# Patient Record
Sex: FEMALE | Race: ASIAN | NOT HISPANIC OR LATINO | Employment: FULL TIME | URBAN - METROPOLITAN AREA
[De-identification: names, ages, dates, MRNs, and addresses within clinical notes are randomized per-mention and may not be internally consistent; named-entity substitution may affect disease eponyms.]

---

## 2019-01-07 ENCOUNTER — TRANSCRIBE ORDERS (OUTPATIENT)
Dept: LAB | Facility: CLINIC | Age: 54
End: 2019-01-07

## 2019-01-07 ENCOUNTER — APPOINTMENT (OUTPATIENT)
Dept: LAB | Facility: CLINIC | Age: 54
End: 2019-01-07
Payer: COMMERCIAL

## 2019-01-07 DIAGNOSIS — L29.8 OTHER PRURITUS: ICD-10-CM

## 2019-01-07 DIAGNOSIS — L29.9 PRURITUS OF SKIN: ICD-10-CM

## 2019-01-07 DIAGNOSIS — L29.8 OTHER PRURITUS: Primary | ICD-10-CM

## 2019-01-07 PROCEDURE — 85652 RBC SED RATE AUTOMATED: CPT

## 2019-01-07 PROCEDURE — 84480 ASSAY TRIIODOTHYRONINE (T3): CPT

## 2019-01-07 PROCEDURE — 36415 COLL VENOUS BLD VENIPUNCTURE: CPT

## 2019-01-07 PROCEDURE — 82785 ASSAY OF IGE: CPT

## 2019-01-07 PROCEDURE — 86003 ALLG SPEC IGE CRUDE XTRC EA: CPT

## 2019-01-07 PROCEDURE — 84436 ASSAY OF TOTAL THYROXINE: CPT

## 2019-01-07 PROCEDURE — 84443 ASSAY THYROID STIM HORMONE: CPT

## 2019-01-08 LAB
ALLERGEN COMMENT: NORMAL
ERYTHROCYTE [SEDIMENTATION RATE] IN BLOOD: 7 MM/HOUR (ref 0–20)
GLUTEN IGE QN: <0.1 KUA/I
T3 SERPL-MCNC: 1.2 NG/ML (ref 0.6–1.8)
T4 SERPL-MCNC: 10.5 UG/DL (ref 4.7–13.3)
TOTAL IGE SMQN RAST: 9.37 KU/L (ref 0–113)
TSH SERPL DL<=0.05 MIU/L-ACNC: 3.25 UIU/ML (ref 0.36–3.74)

## 2019-02-04 ENCOUNTER — APPOINTMENT (OUTPATIENT)
Dept: LAB | Facility: CLINIC | Age: 54
End: 2019-02-04
Payer: COMMERCIAL

## 2019-02-04 DIAGNOSIS — L29.8 OTHER PRURITUS: ICD-10-CM

## 2019-02-04 DIAGNOSIS — L29.9 PRURITUS OF SKIN: ICD-10-CM

## 2019-02-04 LAB
ALBUMIN SERPL BCP-MCNC: 4.2 G/DL (ref 3.5–5)
ALP SERPL-CCNC: 67 U/L (ref 46–116)
ALT SERPL W P-5'-P-CCNC: 95 U/L (ref 12–78)
AST SERPL W P-5'-P-CCNC: 48 U/L (ref 5–45)
BILIRUB DIRECT SERPL-MCNC: 0.1 MG/DL (ref 0–0.2)
BILIRUB SERPL-MCNC: 0.7 MG/DL (ref 0.2–1)
ERYTHROCYTE [SEDIMENTATION RATE] IN BLOOD: 6 MM/HOUR (ref 0–20)
PROT SERPL-MCNC: 7.8 G/DL (ref 6.4–8.2)

## 2019-02-04 PROCEDURE — 86255 FLUORESCENT ANTIBODY SCREEN: CPT

## 2019-02-04 PROCEDURE — 83516 IMMUNOASSAY NONANTIBODY: CPT

## 2019-02-04 PROCEDURE — 80076 HEPATIC FUNCTION PANEL: CPT

## 2019-02-04 PROCEDURE — 36415 COLL VENOUS BLD VENIPUNCTURE: CPT

## 2019-02-04 PROCEDURE — 82784 ASSAY IGA/IGD/IGG/IGM EACH: CPT

## 2019-02-04 PROCEDURE — 85652 RBC SED RATE AUTOMATED: CPT

## 2019-02-05 LAB
ENDOMYSIUM IGA SER QL: NEGATIVE
TTG IGA SER-ACNC: <2 U/ML (ref 0–3)
TTG IGG SER-ACNC: <2 U/ML (ref 0–5)

## 2019-02-06 LAB
ENDOMYSIUM IGA SER QL: NEGATIVE
GLIADIN PEPTIDE IGA SER-ACNC: 3 UNITS (ref 0–19)
GLIADIN PEPTIDE IGG SER-ACNC: 3 UNITS (ref 0–19)
IGA SERPL-MCNC: 92 MG/DL (ref 87–352)
TTG IGA SER-ACNC: <2 U/ML (ref 0–3)
TTG IGG SER-ACNC: <2 U/ML (ref 0–5)

## 2020-08-14 ENCOUNTER — TRANSCRIBE ORDERS (OUTPATIENT)
Dept: ADMINISTRATIVE | Facility: HOSPITAL | Age: 55
End: 2020-08-14

## 2020-08-14 DIAGNOSIS — N97.0 FEMALE INFERTILITY ASSOCIATED WITH ANOVULATION: Primary | ICD-10-CM

## 2020-08-14 DIAGNOSIS — R19.07 GENERALIZED ABDOMINAL OR PELVIC SWELLING OR MASS OR LUMP: ICD-10-CM

## 2020-08-27 ENCOUNTER — HOSPITAL ENCOUNTER (OUTPATIENT)
Dept: ULTRASOUND IMAGING | Facility: CLINIC | Age: 55
Discharge: HOME | End: 2020-08-27
Payer: COMMERCIAL

## 2020-08-27 DIAGNOSIS — N97.0 FEMALE INFERTILITY ASSOCIATED WITH ANOVULATION: ICD-10-CM

## 2020-08-27 DIAGNOSIS — R19.07 GENERALIZED ABDOMINAL OR PELVIC SWELLING OR MASS OR LUMP: ICD-10-CM

## 2020-08-27 PROCEDURE — 76830 TRANSVAGINAL US NON-OB: CPT

## 2020-08-27 PROCEDURE — 76856 US EXAM PELVIC COMPLETE: CPT

## 2021-04-26 ENCOUNTER — TELEPHONE (OUTPATIENT)
Dept: NEUROLOGY | Facility: CLINIC | Age: 56
End: 2021-04-26

## 2021-05-17 ENCOUNTER — TELEPHONE (OUTPATIENT)
Dept: NEUROLOGY | Facility: CLINIC | Age: 56
End: 2021-05-17

## 2021-05-17 NOTE — TELEPHONE ENCOUNTER
ADD ON - patient is scheduled with Dr Antonieta Mendoza in [facility] 5/18 @ [address] [facility] is still the same

## 2021-05-18 ENCOUNTER — CONSULT (OUTPATIENT)
Dept: NEUROLOGY | Facility: CLINIC | Age: 56
End: 2021-05-18
Payer: COMMERCIAL

## 2021-05-18 VITALS — DIASTOLIC BLOOD PRESSURE: 84 MMHG | SYSTOLIC BLOOD PRESSURE: 129 MMHG | HEART RATE: 73 BPM

## 2021-05-18 DIAGNOSIS — R93.0 ABNORMAL MRI OF HEAD: ICD-10-CM

## 2021-05-18 DIAGNOSIS — G62.9 NEUROPATHY: Primary | ICD-10-CM

## 2021-05-18 PROCEDURE — 99244 OFF/OP CNSLTJ NEW/EST MOD 40: CPT | Performed by: PSYCHIATRY & NEUROLOGY

## 2021-05-18 RX ORDER — MONTELUKAST SODIUM 10 MG/1
10 TABLET ORAL
COMMUNITY
End: 2021-11-15

## 2021-05-18 RX ORDER — EVENING PRIMROSE OIL 500 MG
CAPSULE ORAL
COMMUNITY

## 2021-05-18 RX ORDER — CETIRIZINE HYDROCHLORIDE 10 MG/1
CAPSULE, LIQUID FILLED ORAL
COMMUNITY

## 2021-05-18 RX ORDER — ERGOCALCIFEROL 1.25 MG/1
CAPSULE ORAL
COMMUNITY
Start: 2021-03-16

## 2021-05-18 RX ORDER — ALBUTEROL SULFATE 90 UG/1
2 AEROSOL, METERED RESPIRATORY (INHALATION)
COMMUNITY
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
## 2021-05-18 NOTE — ASSESSMENT & PLAN NOTE
Pt with dec vib lona lowers  No myelopathic findings on exam or by history  Will check mri c spine when pt able  Will update neuropathy labs   To consider emg of lowers   Follow up after studies

## 2021-05-18 NOTE — ASSESSMENT & PLAN NOTE
Pt here for neuro visit  Pt last seen about 5 years ago for wm surviellance  Pt here with increased fatigue and pain in arms and legs  Pt notes she has been seen by pcp as well as rheum  Pt with positive gerardo  Rev in detail all labs done from 2018 to present  Pt with some persistent elevation in lfts  Pt also dx with IC and continues to follow with dr Ravinder Herron  Pt exam relatively stable  Pt with no recent neuro hospitalizations  Pt is overdue for mri head and c spine as part of demylinating surviellance  Re rev last set of imaging ie mri c spine from April 2015  Prior subtle c cord abn no longer seen at that time  Pt wishes to do one study first ie mri head and then will order mri c spine once she is able to check on finances with studies to date  Pt to have updated labs for neuropathy as well

## 2021-05-26 ENCOUNTER — HOSPITAL ENCOUNTER (OUTPATIENT)
Dept: MRI IMAGING | Facility: HOSPITAL | Age: 56
Discharge: HOME | End: 2021-05-26
Attending: PSYCHIATRY & NEUROLOGY
Payer: COMMERCIAL

## 2021-05-26 DIAGNOSIS — R93.0 ABNORMAL MRI OF HEAD: ICD-10-CM

## 2021-05-26 DIAGNOSIS — G62.9 NEUROPATHY: ICD-10-CM

## 2021-05-26 PROCEDURE — G1004 CDSM NDSC: HCPCS

## 2021-05-26 PROCEDURE — 70551 MRI BRAIN STEM W/O DYE: CPT

## 2021-06-03 ENCOUNTER — TELEPHONE (OUTPATIENT)
Dept: NEUROLOGY | Facility: CLINIC | Age: 56
End: 2021-06-03

## 2021-06-03 DIAGNOSIS — G62.9 NEUROPATHY: ICD-10-CM

## 2021-06-03 DIAGNOSIS — R93.0 ABNORMAL MRI OF HEAD: Primary | ICD-10-CM

## 2021-06-03 NOTE — TELEPHONE ENCOUNTER
rx for plain mri c spine without urbano in emr  We will do this one without contrast due to her allergy

## 2021-06-03 NOTE — TELEPHONE ENCOUNTER
Pt made aware  Also reviewed all lab results that have been reviewed by Dr Lawler Bear  No further questions  Transferred call to CS

## 2021-06-03 NOTE — TELEPHONE ENCOUNTER
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

## 2021-06-03 NOTE — TELEPHONE ENCOUNTER
Pt made aware, she verbalizes understanding  She is agreeable to MRI c spine  Per chart review, gadolinium derivatives is listed in pt's allergies (low severity- itching, rash)  Confirmed this with the patient  If gadolinium is needed, pt will need allergy prep (medrol and benadryl)  Discussed with pt there are MRI locations with [facility] in the [place] area   Pt is aware to discuss this with CS when scheduling her MRI     [phone]  Shawna Morgan to leave detailed message

## 2021-06-17 ENCOUNTER — HOSPITAL ENCOUNTER (OUTPATIENT)
Dept: MRI IMAGING | Facility: HOSPITAL | Age: 56
Discharge: HOME | End: 2021-06-17
Attending: PSYCHIATRY & NEUROLOGY
Payer: COMMERCIAL

## 2021-06-17 DIAGNOSIS — R93.0 ABNORMAL MRI OF HEAD: ICD-10-CM

## 2021-06-17 DIAGNOSIS — G62.9 NEUROPATHY: ICD-10-CM

## 2021-06-17 PROCEDURE — G1004 CDSM NDSC: HCPCS

## 2021-06-17 PROCEDURE — 72141 MRI NECK SPINE W/O DYE: CPT

## 2021-06-24 ENCOUNTER — TELEPHONE (OUTPATIENT)
Dept: NEUROLOGY | Facility: CLINIC | Age: 56
End: 2021-06-24

## 2021-06-24 NOTE — TELEPHONE ENCOUNTER
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    mri c spine from June no cord pathology  Pt can be kept under both clinical and rads surveillance  rec return clinical visit in 4 months and then we will decide timing of any further imaging based on her clinical review  ----- Message from Jh Choi RN sent at 6/23/2021  3:25 PM EDT -----  Regarding: FW: Non-Urgent Medical Question  Contact: [phone]    ----- Message -----  From: Kam Enamorado  Sent: 6/23/2021   2:34 PM EDT  To: Neurology Manual [place] Clinical Team 3  Subject: RE: Non-Urgent Medical Question                  Yes the last MRI came back normal  What do I need to do to follow up on the first MRI? Any further testing?     ----- Message -----  From: Aneesh Arellano RN  Sent: 6/23/21, 10:59 AM  To: Kam Enamorado  Subject: RE: Non-Urgent Medical Question    Rolo Pepe  The MRI of the cervical spine was essentially a normal study  It notes the previously seen curvature of the back (cervical lordosis) is slightly straightened out  Please feel free to contact the office if you have further questions         ----- Message -----       From:Nel Groves       Sent:6/23/2021  9:48 AM EDT         Paz Schroeder MD    Subject:RE: Non-Urgent Medical Question    To discuss my test results from my last MRI       ----- Message -----       Bob Lassiter RN       Sent:6/23/2021  9:47 AM EDT         To:Nel Edmonds    Subject:RE: Non-Urgent Medical Question    Thank you for contacting [facility],    Could you please clarify on what you need clarification on? Please do not hesitate to call our office at [phone]      Thank you for choosing [facility] for your care      ----- Message -----       From:Nel Gatica       Sent:6/23/2021  7:09 AM EDT         Richie Browne MD    Subject:Non-Urgent Medical Question    Can I please get explanation of this?

## 2021-06-28 ENCOUNTER — TELEPHONE (OUTPATIENT)
Dept: NEUROLOGY | Facility: CLINIC | Age: 56
End: 2021-06-28

## 2021-06-28 NOTE — TELEPHONE ENCOUNTER
August appt is for clinical surviellance of exam   No further imaging right now, unless change in sxs or exam           Regarding: RE: Non-Urgent Medical Question  Contact: [phone]  ----- Message from Fabio Penaloza RN sent at 6/28/2021 10:47 AM EDT -----  Please advise, thank you!     ----- Message from Farzana Pate to Clemente Dubose MD sent at 6/25/2021  4:53 PM -----   I am scheduled for a follow up appointment in August  Should I reschedule my appointment? Is there another test after this that I can get in the meantime instead of waiting for months for another test        ----- Message -----       Jair Zavala RN       Sent:6/25/2021  4:43 PM EDT         To:Nel Mckeon    Subject:RE: Non-Urgent Medical Question    Thank you for contacting [place] [facility] [address] [facility],    Dr Lon Bender wants to let you know that the MRI head in 2021 was compared to the MRI in 2013  Results state "Again noted are several scattered small white matter   lesions within the subcortical white matter similar in size and appearance to the prior exam   A single right paramedian pontine white matter lesion is stable in size "    Dr oLn Bender says there is again concern for chronic demyelinating disease  The MRI cervical spine from June shows no cord pathology  She is recommending that you return to our office for a visit in 4 months and then we will decide the timing of any further imaging based on your visit  Please do not hesitate to call our office at [phone]  Thank you for choosing [facility] for your care      ----- Message -----       From:Nel Mckeon       Sent:6/23/2021  2:34 PM EDT         Rere Parks MD    Subject:RE: Non-Urgent Medical Question    Yes the last MRI came back normal  What do I need to do to follow up on the first MRI?  Any further testing?       ----- Message -----       From:Shahana Oconnor RN       Sent:6/23/2021 10:59 AM EDT         To:Nel Mckeon    Subject:RE: Non-Urgent Medical Question    Geremias Allen  The MRI of the cervical spine was essentially a normal study  It notes the previously seen curvature of the back (cervical lordosis) is slightly straightened out  Please feel free to contact the office if you have further questions         ----- Message -----       From:Nel Reagan       Sent:6/23/2021  9:48 AM EDT         Yohana Pichardo MD    Subject:RE: Non-Urgent Medical Question    To discuss my test results from my last MRI       ----- Message -----       Shyann Dsouza RN       Sent:6/23/2021  9:47 AM EDT         To:Nel Mejia    Subject:RE: Non-Urgent Medical Question    Thank you for contacting [facility],    Could you please clarify on what you need clarification on? Please do not hesitate to call our office at [phone]  Thank you for choosing [facility] for your care      ----- Message -----       From:Nel Mejia       Sent:6/23/2021  7:09 AM EDT         Yohana Pichardo MD    Subject:Non-Urgent Medical Question    Can I please get explanation of this?

## 2021-08-03 DIAGNOSIS — R11.0 NAUSEA: ICD-10-CM

## 2021-08-03 DIAGNOSIS — R10.84 GENERALIZED ABDOMINAL PAIN: ICD-10-CM

## 2021-08-03 DIAGNOSIS — K59.00 CONSTIPATION, UNSPECIFIED: ICD-10-CM

## 2021-08-11 ENCOUNTER — OFFICE VISIT (OUTPATIENT)
Dept: NEUROLOGY | Facility: CLINIC | Age: 56
End: 2021-08-11
Payer: COMMERCIAL

## 2021-08-11 VITALS
WEIGHT: 123.8 LBS | HEIGHT: 63 IN | HEART RATE: 76 BPM | SYSTOLIC BLOOD PRESSURE: 130 MMHG | BODY MASS INDEX: 21.93 KG/M2 | RESPIRATION RATE: 16 BRPM | TEMPERATURE: 97.8 F | DIASTOLIC BLOOD PRESSURE: 84 MMHG

## 2021-08-11 DIAGNOSIS — R93.0 ABNORMAL MRI OF HEAD: Primary | ICD-10-CM

## 2021-08-11 DIAGNOSIS — G47.10 HYPERSOMNIA: ICD-10-CM

## 2021-08-11 PROCEDURE — 99214 OFFICE O/P EST MOD 30 MIN: CPT | Performed by: PHYSICIAN ASSISTANT

## 2021-08-11 RX ORDER — POLYETHYLENE GLYCOL 3350 17 G/17G
17 POWDER, FOR SOLUTION ORAL DAILY
COMMUNITY

## 2021-08-11 RX ORDER — OMEPRAZOLE 20 MG/1
20 CAPSULE, DELAYED RELEASE ORAL 2 TIMES DAILY
COMMUNITY
Start: 2021-06-30

## 2021-08-11 RX ORDER — FAMOTIDINE 20 MG/1
20 TABLET, FILM COATED ORAL 2 TIMES DAILY
COMMUNITY
Start: 2021-08-03

## 2021-08-11 RX ORDER — POTASSIUM CHLORIDE 1500 MG/1
20 TABLET, EXTENDED RELEASE ORAL DAILY
COMMUNITY
Start: 2021-08-03

## 2021-08-11 RX ORDER — UREA 10 %
1 LOTION (ML) TOPICAL
COMMUNITY
End: 2022-06-06
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
## 2021-08-11 NOTE — PATIENT INSTRUCTIONS
Will update MRI brain end of May 2022 and then return after the imaging   I entered a referral to sleep medicine for consult and likely testing after the consult (will defer which testing to the sleep specialist)  Continue to follow with GI, endocrinology    Could consider another rheumatology opinion  Call for any new or worsening symptoms if needing to be seen sooner

## 2021-08-11 NOTE — ASSESSMENT & PLAN NOTE
Patient reports excessive fatigue, non-restorative sleep, delayed onset of sleep, restlessness  We discussed how poor sleep can negatively impact her cognitively and physically during the day  Would benefit from sleep med eval   She is agreeable, referral placed
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
## 2021-08-26 ENCOUNTER — HOSPITAL ENCOUNTER (OUTPATIENT)
Dept: NUCLEAR MEDICINE | Facility: HOSPITAL | Age: 56
Discharge: HOME | End: 2021-08-26
Payer: COMMERCIAL

## 2021-08-26 DIAGNOSIS — K59.00 CONSTIPATION, UNSPECIFIED: ICD-10-CM

## 2021-08-26 DIAGNOSIS — R10.84 GENERALIZED ABDOMINAL PAIN: ICD-10-CM

## 2021-08-26 DIAGNOSIS — R11.0 NAUSEA: ICD-10-CM

## 2021-08-26 PROCEDURE — 78227 HEPATOBIL SYST IMAGE W/DRUG: CPT

## 2021-08-26 PROCEDURE — A9537 TC99M MEBROFENIN: HCPCS

## 2022-06-09 ENCOUNTER — APPOINTMENT (OUTPATIENT)
Dept: LAB | Facility: CLINIC | Age: 57
End: 2022-06-09
Payer: COMMERCIAL

## 2022-06-09 DIAGNOSIS — M35.00 SICCA SYNDROME (HCC): ICD-10-CM

## 2022-06-09 DIAGNOSIS — E55.9 VITAMIN D DEFICIENCY: ICD-10-CM

## 2022-06-09 PROCEDURE — 86160 COMPLEMENT ANTIGEN: CPT

## 2022-06-09 PROCEDURE — 85027 COMPLETE CBC AUTOMATED: CPT

## 2022-06-09 PROCEDURE — 36415 COLL VENOUS BLD VENIPUNCTURE: CPT

## 2022-06-09 PROCEDURE — 82306 VITAMIN D 25 HYDROXY: CPT

## 2022-06-09 PROCEDURE — 80053 COMPREHEN METABOLIC PANEL: CPT

## 2022-06-10 LAB
25(OH)D3 SERPL-MCNC: 55 NG/ML (ref 30–100)
ALBUMIN SERPL BCP-MCNC: 4.2 G/DL (ref 3.5–5)
ALP SERPL-CCNC: 78 U/L (ref 46–116)
ALT SERPL W P-5'-P-CCNC: 26 U/L (ref 12–78)
ANION GAP SERPL CALCULATED.3IONS-SCNC: 5 MMOL/L (ref 4–13)
AST SERPL W P-5'-P-CCNC: 19 U/L (ref 5–45)
BILIRUB SERPL-MCNC: 0.46 MG/DL (ref 0.2–1)
BUN SERPL-MCNC: 17 MG/DL (ref 5–25)
C3 SERPL-MCNC: 123 MG/DL (ref 90–180)
C4 SERPL-MCNC: 29 MG/DL (ref 10–40)
CALCIUM SERPL-MCNC: 10.4 MG/DL (ref 8.3–10.1)
CHLORIDE SERPL-SCNC: 103 MMOL/L (ref 100–108)
CO2 SERPL-SCNC: 31 MMOL/L (ref 21–32)
CREAT SERPL-MCNC: 0.69 MG/DL (ref 0.6–1.3)
ERYTHROCYTE [DISTWIDTH] IN BLOOD BY AUTOMATED COUNT: 12.3 % (ref 11.6–15.1)
GFR SERPL CREATININE-BSD FRML MDRD: 99 ML/MIN/1.73SQ M
GLUCOSE SERPL-MCNC: 94 MG/DL (ref 65–140)
HCT VFR BLD AUTO: 45.9 % (ref 34.8–46.1)
HGB BLD-MCNC: 15.8 G/DL (ref 11.5–15.4)
MCH RBC QN AUTO: 30.8 PG (ref 26.8–34.3)
MCHC RBC AUTO-ENTMCNC: 34.4 G/DL (ref 31.4–37.4)
MCV RBC AUTO: 90 FL (ref 82–98)
PLATELET # BLD AUTO: 243 THOUSANDS/UL (ref 149–390)
PMV BLD AUTO: 10.8 FL (ref 8.9–12.7)
POTASSIUM SERPL-SCNC: 4.3 MMOL/L (ref 3.5–5.3)
PROT SERPL-MCNC: 7.7 G/DL (ref 6.4–8.2)
RBC # BLD AUTO: 5.13 MILLION/UL (ref 3.81–5.12)
SODIUM SERPL-SCNC: 139 MMOL/L (ref 136–145)
WBC # BLD AUTO: 9.77 THOUSAND/UL (ref 4.31–10.16)

## 2022-07-29 ENCOUNTER — APPOINTMENT (OUTPATIENT)
Dept: LAB | Facility: CLINIC | Age: 57
End: 2022-07-29
Payer: COMMERCIAL

## 2022-07-29 DIAGNOSIS — R19.7 DIARRHEA OF PRESUMED INFECTIOUS ORIGIN: ICD-10-CM

## 2022-07-29 PROCEDURE — 87209 SMEAR COMPLEX STAIN: CPT

## 2022-07-29 PROCEDURE — 87177 OVA AND PARASITES SMEARS: CPT

## 2022-07-29 PROCEDURE — 87505 NFCT AGENT DETECTION GI: CPT

## 2022-07-30 LAB
C DIFF TOX GENS STL QL NAA+PROBE: NEGATIVE
CAMPYLOBACTER DNA SPEC NAA+PROBE: NORMAL
SALMONELLA DNA SPEC QL NAA+PROBE: NORMAL
SHIGA TOXIN STX GENE SPEC NAA+PROBE: NORMAL
SHIGELLA DNA SPEC QL NAA+PROBE: NORMAL

## 2022-08-09 ENCOUNTER — HOSPITAL ENCOUNTER (OUTPATIENT)
Dept: BONE DENSITY | Facility: CLINIC | Age: 57
Discharge: HOME | End: 2022-08-09
Payer: COMMERCIAL

## 2022-08-09 DIAGNOSIS — Z78.0 MENOPAUSE: ICD-10-CM

## 2022-08-09 DIAGNOSIS — R79.89 LOW VITAMIN D LEVEL: ICD-10-CM

## 2022-08-09 PROCEDURE — 77080 DXA BONE DENSITY AXIAL: CPT

## 2022-08-17 ENCOUNTER — APPOINTMENT (OUTPATIENT)
Dept: LAB | Facility: CLINIC | Age: 57
End: 2022-08-17
Payer: COMMERCIAL

## 2022-08-17 DIAGNOSIS — Z86.39 PERSONAL HISTORY OF NUTRITIONAL DEFICIENCY: ICD-10-CM

## 2022-08-17 DIAGNOSIS — D50.9 IRON DEFICIENCY ANEMIA, UNSPECIFIED IRON DEFICIENCY ANEMIA TYPE: ICD-10-CM

## 2022-08-17 LAB
25(OH)D3 SERPL-MCNC: 43 NG/ML (ref 30–100)
ALBUMIN SERPL BCP-MCNC: 3.8 G/DL (ref 3.5–5)
ALP SERPL-CCNC: 82 U/L (ref 46–116)
ALT SERPL W P-5'-P-CCNC: 19 U/L (ref 12–78)
ANION GAP SERPL CALCULATED.3IONS-SCNC: 6 MMOL/L (ref 4–13)
AST SERPL W P-5'-P-CCNC: 15 U/L (ref 5–45)
BILIRUB SERPL-MCNC: 0.37 MG/DL (ref 0.2–1)
BUN SERPL-MCNC: 15 MG/DL (ref 5–25)
CALCIUM SERPL-MCNC: 8.9 MG/DL (ref 8.3–10.1)
CHLORIDE SERPL-SCNC: 105 MMOL/L (ref 96–108)
CO2 SERPL-SCNC: 30 MMOL/L (ref 21–32)
CREAT SERPL-MCNC: 0.76 MG/DL (ref 0.6–1.3)
FERRITIN SERPL-MCNC: 132 NG/ML (ref 8–388)
GFR SERPL CREATININE-BSD FRML MDRD: 89 ML/MIN/1.73SQ M
GLUCOSE P FAST SERPL-MCNC: 106 MG/DL (ref 65–99)
IRON SATN MFR SERPL: 20 % (ref 15–50)
IRON SERPL-MCNC: 68 UG/DL (ref 50–170)
MAGNESIUM SERPL-MCNC: 2.3 MG/DL (ref 1.6–2.6)
POTASSIUM SERPL-SCNC: 3.4 MMOL/L (ref 3.5–5.3)
PROT SERPL-MCNC: 7.1 G/DL (ref 6.4–8.4)
SODIUM SERPL-SCNC: 141 MMOL/L (ref 135–147)
TIBC SERPL-MCNC: 341 UG/DL (ref 250–450)

## 2022-08-17 PROCEDURE — 83540 ASSAY OF IRON: CPT

## 2022-08-17 PROCEDURE — 82306 VITAMIN D 25 HYDROXY: CPT

## 2022-08-17 PROCEDURE — 83735 ASSAY OF MAGNESIUM: CPT

## 2022-08-17 PROCEDURE — 83550 IRON BINDING TEST: CPT

## 2022-08-17 PROCEDURE — 36415 COLL VENOUS BLD VENIPUNCTURE: CPT

## 2022-08-17 PROCEDURE — 80053 COMPREHEN METABOLIC PANEL: CPT

## 2022-08-17 PROCEDURE — 82728 ASSAY OF FERRITIN: CPT

## 2022-09-17 ENCOUNTER — HOSPITAL ENCOUNTER (EMERGENCY)
Facility: HOSPITAL | Age: 57
Discharge: HOME | End: 2022-09-17
Attending: EMERGENCY MEDICINE
Payer: COMMERCIAL

## 2022-09-17 VITALS
DIASTOLIC BLOOD PRESSURE: 96 MMHG | OXYGEN SATURATION: 95 % | TEMPERATURE: 98.3 F | BODY MASS INDEX: 22.18 KG/M2 | RESPIRATION RATE: 21 BRPM | SYSTOLIC BLOOD PRESSURE: 150 MMHG | WEIGHT: 125.22 LBS | HEART RATE: 80 BPM

## 2022-09-17 DIAGNOSIS — G51.0 BELL'S PALSY: Primary | ICD-10-CM

## 2022-09-17 LAB
ALBUMIN SERPL BCP-MCNC: 4.4 G/DL (ref 3.5–5)
ALP SERPL-CCNC: 95 U/L (ref 46–116)
ALT SERPL W P-5'-P-CCNC: 22 U/L (ref 12–78)
ANION GAP SERPL CALCULATED.3IONS-SCNC: 8 MMOL/L (ref 4–13)
AST SERPL W P-5'-P-CCNC: 14 U/L (ref 5–45)
ATRIAL RATE: 79 BPM
BASOPHILS # BLD AUTO: 0.09 THOUSANDS/ΜL (ref 0–0.1)
BASOPHILS NFR BLD AUTO: 1 % (ref 0–1)
BILIRUB SERPL-MCNC: 0.72 MG/DL (ref 0.2–1)
BUN SERPL-MCNC: 10 MG/DL (ref 5–25)
CALCIUM SERPL-MCNC: 9.4 MG/DL (ref 8.3–10.1)
CARDIAC TROPONIN I PNL SERPL HS: 3 NG/L
CHLORIDE SERPL-SCNC: 101 MMOL/L (ref 96–108)
CO2 SERPL-SCNC: 33 MMOL/L (ref 21–32)
CREAT SERPL-MCNC: 0.68 MG/DL (ref 0.6–1.3)
EOSINOPHIL # BLD AUTO: 0.18 THOUSAND/ΜL (ref 0–0.61)
EOSINOPHIL NFR BLD AUTO: 2 % (ref 0–6)
ERYTHROCYTE [DISTWIDTH] IN BLOOD BY AUTOMATED COUNT: 12.2 % (ref 11.6–15.1)
GFR SERPL CREATININE-BSD FRML MDRD: 99 ML/MIN/1.73SQ M
GLUCOSE SERPL-MCNC: 86 MG/DL (ref 65–140)
HCT VFR BLD AUTO: 49.7 % (ref 34.8–46.1)
HGB BLD-MCNC: 16.7 G/DL (ref 11.5–15.4)
IMM GRANULOCYTES # BLD AUTO: 0.03 THOUSAND/UL (ref 0–0.2)
IMM GRANULOCYTES NFR BLD AUTO: 0 % (ref 0–2)
LYMPHOCYTES # BLD AUTO: 2.93 THOUSANDS/ΜL (ref 0.6–4.47)
LYMPHOCYTES NFR BLD AUTO: 28 % (ref 14–44)
MCH RBC QN AUTO: 30 PG (ref 26.8–34.3)
MCHC RBC AUTO-ENTMCNC: 33.6 G/DL (ref 31.4–37.4)
MCV RBC AUTO: 89 FL (ref 82–98)
MONOCYTES # BLD AUTO: 0.99 THOUSAND/ΜL (ref 0.17–1.22)
MONOCYTES NFR BLD AUTO: 10 % (ref 4–12)
NEUTROPHILS # BLD AUTO: 6.15 THOUSANDS/ΜL (ref 1.85–7.62)
NEUTS SEG NFR BLD AUTO: 59 % (ref 43–75)
NRBC BLD AUTO-RTO: 0 /100 WBCS
P AXIS: 48 DEGREES
PLATELET # BLD AUTO: 290 THOUSANDS/UL (ref 149–390)
PMV BLD AUTO: 9.9 FL (ref 8.9–12.7)
POTASSIUM SERPL-SCNC: 3 MMOL/L (ref 3.5–5.3)
PR INTERVAL: 150 MS
PROT SERPL-MCNC: 8.3 G/DL (ref 6.4–8.4)
QRS AXIS: -71 DEGREES
QRSD INTERVAL: 88 MS
QT INTERVAL: 392 MS
QTC INTERVAL: 449 MS
RBC # BLD AUTO: 5.57 MILLION/UL (ref 3.81–5.12)
SODIUM SERPL-SCNC: 142 MMOL/L (ref 135–147)
T WAVE AXIS: 28 DEGREES
VENTRICULAR RATE: 79 BPM
WBC # BLD AUTO: 10.37 THOUSAND/UL (ref 4.31–10.16)

## 2022-09-17 PROCEDURE — 99284 EMERGENCY DEPT VISIT MOD MDM: CPT | Performed by: NURSE PRACTITIONER

## 2022-09-17 PROCEDURE — 84484 ASSAY OF TROPONIN QUANT: CPT

## 2022-09-17 PROCEDURE — 80053 COMPREHEN METABOLIC PANEL: CPT

## 2022-09-17 PROCEDURE — 99284 EMERGENCY DEPT VISIT MOD MDM: CPT

## 2022-09-17 PROCEDURE — 86618 LYME DISEASE ANTIBODY: CPT | Performed by: NURSE PRACTITIONER

## 2022-09-17 PROCEDURE — 93010 ELECTROCARDIOGRAM REPORT: CPT | Performed by: INTERNAL MEDICINE

## 2022-09-17 PROCEDURE — 85025 COMPLETE CBC W/AUTO DIFF WBC: CPT

## 2022-09-17 PROCEDURE — 93005 ELECTROCARDIOGRAM TRACING: CPT

## 2022-09-17 PROCEDURE — 36415 COLL VENOUS BLD VENIPUNCTURE: CPT

## 2022-09-17 RX ORDER — PREDNISONE 20 MG/1
TABLET ORAL
Qty: 24 TABLET | Refills: 0 | Status: SHIPPED | OUTPATIENT
Start: 2022-09-17

## 2022-09-17 RX ORDER — PREDNISONE 20 MG/1
60 TABLET ORAL ONCE
Status: COMPLETED | OUTPATIENT
Start: 2022-09-17 | End: 2022-09-17

## 2022-09-17 RX ORDER — DULOXETIN HYDROCHLORIDE 30 MG/1
30 CAPSULE, DELAYED RELEASE ORAL DAILY
COMMUNITY
Start: 2022-08-24

## 2022-09-17 RX ADMIN — PREDNISONE 60 MG: 20 TABLET ORAL at 11:35

## 2022-09-17 NOTE — Clinical Note
Maame Wanger was seen and treated in our emergency department on 9/17/2022  No restrictions            Diagnosis:     Haily  may return to work on return date  She may return on this date: 09/21/2022         If you have any questions or concerns, please don't hesitate to call        Cathy Rangel RN    ______________________________           _______________          _______________  Hospital Representative                              Date                                Time

## 2022-09-17 NOTE — ED PROVIDER NOTES
History  Chief Complaint   Patient presents with    Facial Droop     Left sided facial droop x 1 5 days and headache x days      This is a 68-year-old female that presents here today with left-sided facial paralysis she reports about 2 days ago she was having a pretty wicked left-sided headache and associated periauricular pain  Today she noticed when she was brushing her teeth the did not feel the same as the other side so she presented here  She clearly has facial paralysis involving the left upper and lower face  Her left eye is not completely blinking but can be closed with intention          Prior to Admission Medications   Prescriptions Last Dose Informant Patient Reported? Taking?    Breo Ellipta 200-25 MCG/INH inhaler   No No   Sig: INHALE 1 PUFF BY MOUTH EVERY DAY   Cetirizine HCl (ZyrTEC Allergy) 10 MG CAPS   Yes No   Sig: Take by mouth   DULoxetine (CYMBALTA) 30 mg delayed release capsule   Yes No   Sig: Take 30 mg by mouth daily   Evening Primrose Oil 500 MG CAPS   Yes No   Sig: Take by mouth   Klor-Con M20 20 MEQ tablet   Yes No   Sig: Take 20 mEq by mouth daily   albuterol (PROVENTIL HFA,VENTOLIN HFA) 90 mcg/act inhaler   Yes No   Sig: Inhale 2 puffs   ergocalciferol (VITAMIN D2) 50,000 units   Yes No   Sig: TAKE 1 CAPSULE BY MOUTH ONE TIME PER WEEK   famotidine (PEPCID) 20 mg tablet   Yes No   Sig: Take 20 mg by mouth 2 (two) times a day   hydroxychloroquine (PLAQUENIL) 200 mg tablet   Yes No   Sig: Take 200 mg by mouth daily   montelukast (SINGULAIR) 10 mg tablet   No No   Sig: TAKE 1 TABLET BY MOUTH EVERY DAY   omeprazole (PriLOSEC) 20 mg delayed release capsule   Yes No   Sig: Take 20 mg by mouth 2 (two) times a day    polyethylene glycol (GLYCOLAX) 17 GM/SCOOP powder   Yes No   Sig: Take 17 g by mouth daily   vitamin E 100 UNIT capsule   Yes No   Sig: Take 100 Units by mouth      Facility-Administered Medications: None       Past Medical History:   Diagnosis Date    GERD (gastroesophageal reflux disease)     Irritable bowel syndrome with constipation     [facility]       No past surgical history on file  Family History   Adopted: Yes   Family history unknown: Yes     I have reviewed and agree with the history as documented  E-Cigarette/Vaping    E-Cigarette Use Never User      E-Cigarette/Vaping Substances    Nicotine No     THC No     CBD No     Flavoring No     Other No     Unknown No      Social History     Tobacco Use    Smoking status: Never Smoker    Smokeless tobacco: Never Used   Vaping Use    Vaping Use: Never used   Substance Use Topics    Alcohol use: Never    Drug use: Never       Review of Systems   Constitutional: Negative for diaphoresis, fatigue and fever  HENT: Negative for congestion, ear pain, nosebleeds and sore throat  Eyes: Negative for photophobia, pain, discharge and visual disturbance  Respiratory: Negative for cough, choking, chest tightness, shortness of breath and wheezing  Cardiovascular: Negative for chest pain and palpitations  Gastrointestinal: Negative for abdominal distention, abdominal pain, diarrhea and vomiting  Genitourinary: Negative for dysuria, flank pain and frequency  Musculoskeletal: Negative for back pain, gait problem and joint swelling  Skin: Negative for color change and rash  Neurological: Positive for facial asymmetry  Negative for dizziness, syncope, speech difficulty, weakness and headaches  Psychiatric/Behavioral: Negative for behavioral problems and confusion  The patient is not nervous/anxious  All other systems reviewed and are negative  Physical Exam  Physical Exam  Vitals and nursing note reviewed  Constitutional:       General: She is not in acute distress  Appearance: She is well-developed  She is not ill-appearing or toxic-appearing  HENT:      Head: Normocephalic and atraumatic  Mouth/Throat:      Dentition: Normal dentition     Eyes:      General: Right eye: No discharge  Left eye: No discharge  Cardiovascular:      Rate and Rhythm: Normal rate and regular rhythm  Pulmonary:      Effort: Pulmonary effort is normal  No accessory muscle usage or respiratory distress  Abdominal:      General: There is no distension  Tenderness: There is no guarding  Musculoskeletal:         General: Normal range of motion  Cervical back: Normal range of motion and neck supple  Skin:     General: Skin is warm and dry  Neurological:      Mental Status: She is alert and oriented to person, place, and time  Coordination: Coordination normal       Comments: House Brackmann grade 3 moderate dysfunction   Psychiatric:         Behavior: Behavior is cooperative           Vital Signs  ED Triage Vitals   Temperature Pulse Respirations Blood Pressure SpO2   09/17/22 1137 09/17/22 1103 09/17/22 1103 09/17/22 1103 09/17/22 1103   98 3 °F (36 8 °C) 94 18 (!) 196/102 97 %      Temp Source Heart Rate Source Patient Position - Orthostatic VS BP Location FiO2 (%)   09/17/22 1137 -- -- -- --   Oral          Pain Score       --                  Vitals:    09/17/22 1103   BP: (!) 196/102   Pulse: 94         Visual Acuity      ED Medications  Medications   predniSONE tablet 60 mg (60 mg Oral Given 9/17/22 1135)       Diagnostic Studies  Results Reviewed     Procedure Component Value Units Date/Time    HS Troponin I 2hr [[id number]]     Lab Status: No result Specimen: Blood     HS Troponin 0hr (reflex protocol) [[id number]]  (Normal) Collected: 09/17/22 1106    Lab Status: Final result Specimen: Blood from Arm, Left Updated: 09/17/22 1139     hs TnI 0hr 3 ng/L     Comprehensive metabolic panel [[id number]]  (Abnormal) Collected: 09/17/22 1106    Lab Status: Final result Specimen: Blood from Arm, Left Updated: 09/17/22 1135     Sodium 142 mmol/L      Potassium 3 0 mmol/L      Chloride 101 mmol/L      CO2 33 mmol/L      ANION GAP 8 mmol/L      BUN 10 mg/dL      Creatinine 0 68 mg/dL      Glucose 86 mg/dL      Calcium 9 4 mg/dL      AST 14 U/L      ALT 22 U/L      Alkaline Phosphatase 95 U/L      Total Protein 8 3 g/dL      Albumin 4 4 g/dL      Total Bilirubin 0 72 mg/dL      eGFR 99 ml/min/1 73sq m     Narrative:      [place] guidelines for Chronic Kidney Disease (CKD):     Stage 1 with normal or high GFR (GFR > 90 mL/min/1 73 square meters)    Stage 2 Mild CKD (GFR = 60-89 mL/min/1 73 square meters)    Stage 3A Moderate CKD (GFR = 45-59 mL/min/1 73 square meters)    Stage 3B Moderate CKD (GFR = 30-44 mL/min/1 73 square meters)    Stage 4 Severe CKD (GFR = 15-29 mL/min/1 73 square meters)    Stage 5 End Stage CKD (GFR <15 mL/min/1 73 square meters)  Note: GFR calculation is accurate only with a steady state creatinine    Lyme Antibody Profile with reflex to [facility] [[id number]] Collected: 09/17/22 1128    Lab Status:  In process Specimen: Blood from Arm, Left Updated: 09/17/22 1132    CBC and differential [[id number]]  (Abnormal) Collected: 09/17/22 1106    Lab Status: Final result Specimen: Blood from Arm, Left Updated: 09/17/22 1113     WBC 10 37 Thousand/uL      RBC 5 57 Million/uL      Hemoglobin 16 7 g/dL      Hematocrit 49 7 %      MCV 89 fL      MCH 30 0 pg      MCHC 33 6 g/dL      RDW 12 2 %      MPV 9 9 fL      Platelets 297 Thousands/uL      nRBC 0 /100 WBCs      Neutrophils Relative 59 %      Immat GRANS % 0 %      Lymphocytes Relative 28 %      Monocytes Relative 10 %      Eosinophils Relative 2 %      Basophils Relative 1 %      Neutrophils Absolute 6 15 Thousands/µL      Immature Grans Absolute 0 03 Thousand/uL      Lymphocytes Absolute 2 93 Thousands/µL      Monocytes Absolute 0 99 Thousand/µL      Eosinophils Absolute 0 18 Thousand/µL      Basophils Absolute 0 09 Thousands/µL                  No orders to display              Procedures  Procedures         ED Course                                             MDM  Number of Diagnoses or Management Options  Bell's palsy: new and requires workup  Diagnosis management comments: Symptoms consistent with a Bell's palsy  Nothing that would suggest stroke  No hemiparesis or weakness  No aphasia  Amount and/or Complexity of Data Reviewed  Clinical lab tests: ordered and reviewed  Independent visualization of images, tracings, or specimens: yes    Patient Progress  Patient progress: stable      Disposition  Final diagnoses:   Bell's palsy     Time reflects when diagnosis was documented in both MDM as applicable and the Disposition within this note     Time User Action Codes Description Comment    9/17/2022 11:58 AM Ludin Narvaez Add [G51 0] Bell's palsy       ED Disposition     ED Disposition   Discharge    Condition   Stable    Date/Time   Sat Sep 17, 2022 11:58 AM    Comment   Sonam Oquendo discharge to home/self care  Follow-up Information     Follow up With Specialties Details Why Contact Info Additional Information    Priti Ty PA-C Physician Assistant Schedule an appointment as soon as possible for a visit  For [address]  [address] [place] [id number]  [facility] Neurology [address] [facility] Neurology Schedule an appointment as soon as possible for a visit  For Continued Evaluation [address] [facility] [id number]  [address] [facility] Neurology [address], [address] [phone]          Patient's Medications   Discharge Prescriptions    PREDNISONE 20 MG TABLET    Take 60 mg by mouth daily 4 days, 40 mg by mouth daily 4 days, 20 mg by mouth daily 4 days       Start Date: 9/17/2022 End Date: --       Order Dose: --       Quantity: 24 tablet    Refills: 0       No discharge procedures on file      PDMP Review     None          ED Provider  Electronically Signed by           YOLY Junior  09/17/22 1200

## 2022-09-19 LAB — B BURGDOR IGG+IGM SER-ACNC: 0.2 AI

## 2022-10-06 ENCOUNTER — HOSPITAL ENCOUNTER (OUTPATIENT)
Dept: CT IMAGING | Facility: HOSPITAL | Age: 57
End: 2022-10-06
Payer: COMMERCIAL

## 2022-10-06 DIAGNOSIS — G51.0 BELL'S PALSY: ICD-10-CM

## 2022-10-06 PROCEDURE — 70470 CT HEAD/BRAIN W/O & W/DYE: CPT

## 2022-10-06 RX ADMIN — IOHEXOL 100 ML: 350 INJECTION, SOLUTION INTRAVENOUS at 11:56

## 2022-10-17 ENCOUNTER — APPOINTMENT (OUTPATIENT)
Dept: LAB | Facility: HOSPITAL | Age: 57
End: 2022-10-17
Payer: COMMERCIAL

## 2022-10-17 DIAGNOSIS — Z86.39 HISTORY OF LOW POTASSIUM: ICD-10-CM

## 2022-10-17 LAB
ALBUMIN SERPL BCP-MCNC: 4 G/DL (ref 3.5–5)
ALP SERPL-CCNC: 93 U/L (ref 46–116)
ALT SERPL W P-5'-P-CCNC: 20 U/L (ref 12–78)
ANION GAP SERPL CALCULATED.3IONS-SCNC: 9 MMOL/L (ref 4–13)
AST SERPL W P-5'-P-CCNC: 13 U/L (ref 5–45)
BILIRUB SERPL-MCNC: 0.58 MG/DL (ref 0.2–1)
BUN SERPL-MCNC: 16 MG/DL (ref 5–25)
CALCIUM SERPL-MCNC: 9.5 MG/DL (ref 8.3–10.1)
CHLORIDE SERPL-SCNC: 103 MMOL/L (ref 96–108)
CO2 SERPL-SCNC: 33 MMOL/L (ref 21–32)
CREAT SERPL-MCNC: 0.8 MG/DL (ref 0.6–1.3)
GFR SERPL CREATININE-BSD FRML MDRD: 83 ML/MIN/1.73SQ M
GLUCOSE P FAST SERPL-MCNC: 98 MG/DL (ref 65–99)
POTASSIUM SERPL-SCNC: 2.9 MMOL/L (ref 3.5–5.3)
PROT SERPL-MCNC: 7.4 G/DL (ref 6.4–8.4)
SODIUM SERPL-SCNC: 145 MMOL/L (ref 135–147)

## 2022-10-17 PROCEDURE — 36415 COLL VENOUS BLD VENIPUNCTURE: CPT

## 2022-10-17 PROCEDURE — 80053 COMPREHEN METABOLIC PANEL: CPT

## 2022-11-21 ENCOUNTER — APPOINTMENT (OUTPATIENT)
Dept: LAB | Facility: HOSPITAL | Age: 57
End: 2022-11-21

## 2022-11-21 DIAGNOSIS — G51.0 BELL'S PALSY: ICD-10-CM

## 2022-11-21 DIAGNOSIS — M35.00 SJOGREN'S SYNDROME WITHOUT EXTRAGLANDULAR INVOLVEMENT (HCC): ICD-10-CM

## 2022-11-21 DIAGNOSIS — Z86.39 HISTORY OF LOW POTASSIUM: ICD-10-CM

## 2022-11-22 LAB
ALBUMIN SERPL BCP-MCNC: 4.1 G/DL (ref 3.5–5)
ALP SERPL-CCNC: 75 U/L (ref 46–116)
ALT SERPL W P-5'-P-CCNC: 23 U/L (ref 12–78)
ANION GAP SERPL CALCULATED.3IONS-SCNC: 5 MMOL/L (ref 4–13)
AST SERPL W P-5'-P-CCNC: 17 U/L (ref 5–45)
BILIRUB SERPL-MCNC: 0.72 MG/DL (ref 0.2–1)
BUN SERPL-MCNC: 12 MG/DL (ref 5–25)
CALCIUM SERPL-MCNC: 9.8 MG/DL (ref 8.3–10.1)
CHLORIDE SERPL-SCNC: 105 MMOL/L (ref 96–108)
CO2 SERPL-SCNC: 30 MMOL/L (ref 21–32)
CREAT SERPL-MCNC: 0.74 MG/DL (ref 0.6–1.3)
GFR SERPL CREATININE-BSD FRML MDRD: 92 ML/MIN/1.73SQ M
GLUCOSE P FAST SERPL-MCNC: 102 MG/DL (ref 65–99)
MAGNESIUM SERPL-MCNC: 2.4 MG/DL (ref 1.6–2.6)
POTASSIUM SERPL-SCNC: 3.8 MMOL/L (ref 3.5–5.3)
PROT SERPL-MCNC: 7.2 G/DL (ref 6.4–8.4)
SODIUM SERPL-SCNC: 140 MMOL/L (ref 135–147)

## 2022-12-12 PROBLEM — R76.8 POSITIVE ANTINUCLEAR ANTIBODY: Status: ACTIVE | Noted: 2021-10-07

## 2022-12-12 PROBLEM — M35.00 SJOGREN'S SYNDROME WITHOUT EXTRAGLANDULAR INVOLVEMENT (HCC): Status: ACTIVE | Noted: 2022-09-23

## 2022-12-12 PROBLEM — G47.00 INSOMNIA: Status: ACTIVE | Noted: 2021-11-18

## 2022-12-12 PROBLEM — J30.89 ENVIRONMENTAL AND SEASONAL ALLERGIES: Status: ACTIVE | Noted: 2021-03-16

## 2022-12-12 PROBLEM — J30.9 ALLERGIC RHINITIS: Status: ACTIVE | Noted: 2022-12-12

## 2022-12-12 PROBLEM — K90.41 NCGS (NON-CELIAC GLUTEN SENSITIVITY): Status: ACTIVE | Noted: 2022-05-10

## 2022-12-12 PROBLEM — J45.40 MODERATE PERSISTENT ASTHMA WITHOUT COMPLICATION: Status: ACTIVE | Noted: 2022-12-12

## 2022-12-12 PROBLEM — K21.9 GASTROESOPHAGEAL REFLUX DISEASE WITHOUT ESOPHAGITIS: Status: ACTIVE | Noted: 2022-12-12

## 2022-12-12 PROBLEM — N30.10 CYSTITIS, INTERSTITIAL: Status: ACTIVE | Noted: 2021-03-16

## 2022-12-12 PROBLEM — M25.60 JOINT STIFFNESS: Status: ACTIVE | Noted: 2021-06-14

## 2022-12-12 PROBLEM — Z86.39 HISTORY OF LOW POTASSIUM: Status: ACTIVE | Noted: 2021-11-18

## 2022-12-12 PROBLEM — R05.9 COUGH: Status: ACTIVE | Noted: 2018-03-08

## 2022-12-21 DIAGNOSIS — J45.40 MODERATE PERSISTENT ASTHMA WITHOUT COMPLICATION: Primary | ICD-10-CM

## 2022-12-21 RX ORDER — FLUTICASONE FUROATE, UMECLIDINIUM BROMIDE AND VILANTEROL TRIFENATATE 100; 62.5; 25 UG/1; UG/1; UG/1
1 POWDER RESPIRATORY (INHALATION) DAILY
Qty: 60 BLISTER | Refills: 0 | Status: SHIPPED | OUTPATIENT
Start: 2022-12-21 | End: 2023-01-20

## 2022-12-21 NOTE — PROGRESS NOTES
She was recently treated for rhinosinusitis with Zithromax  Symptoms of cough has improved but not completely gone away  As to the efficacy of trelegy, not certain as to whether any different than Breo but this new prescription was only for 1 week and hence difficult to assess and therefore we will give additional 1 month supply    Explained to the patient that if the cough does not improve then to call and in that situation we may consider further evaluation from the standpoint of sinusitis

## 2023-01-02 ENCOUNTER — APPOINTMENT (OUTPATIENT)
Dept: LAB | Facility: HOSPITAL | Age: 58
End: 2023-01-02

## 2023-01-02 DIAGNOSIS — Z86.39 HISTORY OF LOW POTASSIUM: ICD-10-CM

## 2023-01-02 LAB
ALBUMIN SERPL BCP-MCNC: 4.2 G/DL (ref 3.5–5)
ALP SERPL-CCNC: 88 U/L (ref 46–116)
ALT SERPL W P-5'-P-CCNC: 25 U/L (ref 12–78)
ANION GAP SERPL CALCULATED.3IONS-SCNC: 9 MMOL/L (ref 4–13)
AST SERPL W P-5'-P-CCNC: 26 U/L (ref 5–45)
BILIRUB SERPL-MCNC: 0.6 MG/DL (ref 0.2–1)
BUN SERPL-MCNC: 9 MG/DL (ref 5–25)
CALCIUM SERPL-MCNC: 9.1 MG/DL (ref 8.3–10.1)
CHLORIDE SERPL-SCNC: 103 MMOL/L (ref 96–108)
CO2 SERPL-SCNC: 30 MMOL/L (ref 21–32)
CREAT SERPL-MCNC: 0.71 MG/DL (ref 0.6–1.3)
GFR SERPL CREATININE-BSD FRML MDRD: 96 ML/MIN/1.73SQ M
GLUCOSE SERPL-MCNC: 101 MG/DL (ref 65–140)
POTASSIUM SERPL-SCNC: 3.2 MMOL/L (ref 3.5–5.3)
PROT SERPL-MCNC: 7.4 G/DL (ref 6.4–8.4)
SODIUM SERPL-SCNC: 142 MMOL/L (ref 135–147)

## 2023-02-10 PROBLEM — R05.9 COUGH: Status: RESOLVED | Noted: 2018-03-08 | Resolved: 2023-02-10

## 2023-03-02 ENCOUNTER — APPOINTMENT (OUTPATIENT)
Dept: LAB | Facility: CLINIC | Age: 58
End: 2023-03-02

## 2023-03-02 DIAGNOSIS — E55.9 AVITAMINOSIS D: ICD-10-CM

## 2023-03-02 DIAGNOSIS — M35.00 SICCA SYNDROME (HCC): ICD-10-CM

## 2023-03-02 DIAGNOSIS — Z79.899 ENCOUNTER FOR LONG-TERM (CURRENT) USE OF OTHER MEDICATIONS: ICD-10-CM

## 2023-03-02 LAB
ALBUMIN SERPL BCP-MCNC: 4.2 G/DL (ref 3.5–5)
ALP SERPL-CCNC: 83 U/L (ref 46–116)
ALT SERPL W P-5'-P-CCNC: 32 U/L (ref 12–78)
ANION GAP SERPL CALCULATED.3IONS-SCNC: 3 MMOL/L (ref 4–13)
AST SERPL W P-5'-P-CCNC: 24 U/L (ref 5–45)
BASOPHILS # BLD AUTO: 0.08 THOUSANDS/ÂΜL (ref 0–0.1)
BASOPHILS NFR BLD AUTO: 1 % (ref 0–1)
BILIRUB SERPL-MCNC: 0.36 MG/DL (ref 0.2–1)
BUN SERPL-MCNC: 15 MG/DL (ref 5–25)
CALCIUM SERPL-MCNC: 9.5 MG/DL (ref 8.3–10.1)
CHLORIDE SERPL-SCNC: 105 MMOL/L (ref 96–108)
CO2 SERPL-SCNC: 30 MMOL/L (ref 21–32)
CREAT SERPL-MCNC: 0.76 MG/DL (ref 0.6–1.3)
EOSINOPHIL # BLD AUTO: 0.37 THOUSAND/ÂΜL (ref 0–0.61)
EOSINOPHIL NFR BLD AUTO: 4 % (ref 0–6)
ERYTHROCYTE [DISTWIDTH] IN BLOOD BY AUTOMATED COUNT: 12.3 % (ref 11.6–15.1)
GFR SERPL CREATININE-BSD FRML MDRD: 88 ML/MIN/1.73SQ M
GLUCOSE P FAST SERPL-MCNC: 95 MG/DL (ref 65–99)
HCT VFR BLD AUTO: 45.9 % (ref 34.8–46.1)
HGB BLD-MCNC: 15.4 G/DL (ref 11.5–15.4)
IMM GRANULOCYTES # BLD AUTO: 0.03 THOUSAND/UL (ref 0–0.2)
IMM GRANULOCYTES NFR BLD AUTO: 0 % (ref 0–2)
LYMPHOCYTES # BLD AUTO: 2.89 THOUSANDS/ÂΜL (ref 0.6–4.47)
LYMPHOCYTES NFR BLD AUTO: 33 % (ref 14–44)
MCH RBC QN AUTO: 29.7 PG (ref 26.8–34.3)
MCHC RBC AUTO-ENTMCNC: 33.6 G/DL (ref 31.4–37.4)
MCV RBC AUTO: 88 FL (ref 82–98)
MONOCYTES # BLD AUTO: 0.89 THOUSAND/ÂΜL (ref 0.17–1.22)
MONOCYTES NFR BLD AUTO: 10 % (ref 4–12)
NEUTROPHILS # BLD AUTO: 4.4 THOUSANDS/ÂΜL (ref 1.85–7.62)
NEUTS SEG NFR BLD AUTO: 52 % (ref 43–75)
NRBC BLD AUTO-RTO: 0 /100 WBCS
PLATELET # BLD AUTO: 268 THOUSANDS/UL (ref 149–390)
PMV BLD AUTO: 10.7 FL (ref 8.9–12.7)
POTASSIUM SERPL-SCNC: 3.6 MMOL/L (ref 3.5–5.3)
PROT SERPL-MCNC: 7 G/DL (ref 6.4–8.4)
RBC # BLD AUTO: 5.19 MILLION/UL (ref 3.81–5.12)
SODIUM SERPL-SCNC: 138 MMOL/L (ref 135–147)
WBC # BLD AUTO: 8.66 THOUSAND/UL (ref 4.31–10.16)

## 2023-03-03 LAB
25(OH)D3 SERPL-MCNC: 33.2 NG/ML (ref 30–100)
C3 SERPL-MCNC: 112 MG/DL (ref 90–180)
C4 SERPL-MCNC: 27 MG/DL (ref 10–40)

## 2023-05-08 ENCOUNTER — APPOINTMENT (OUTPATIENT)
Dept: LAB | Facility: HOSPITAL | Age: 58
End: 2023-05-08

## 2023-05-08 DIAGNOSIS — M35.00 SICCA SYNDROME (HCC): ICD-10-CM

## 2023-05-08 LAB
ALBUMIN SERPL BCP-MCNC: 4.7 G/DL (ref 3.5–5)
ALP SERPL-CCNC: 80 U/L (ref 34–104)
ALT SERPL W P-5'-P-CCNC: 25 U/L (ref 7–52)
ANION GAP SERPL CALCULATED.3IONS-SCNC: 6 MMOL/L (ref 4–13)
AST SERPL W P-5'-P-CCNC: 22 U/L (ref 13–39)
BILIRUB SERPL-MCNC: 0.47 MG/DL (ref 0.2–1)
BUN SERPL-MCNC: 16 MG/DL (ref 5–25)
CALCIUM SERPL-MCNC: 10.1 MG/DL (ref 8.4–10.2)
CHLORIDE SERPL-SCNC: 102 MMOL/L (ref 96–108)
CO2 SERPL-SCNC: 32 MMOL/L (ref 21–32)
CREAT SERPL-MCNC: 0.71 MG/DL (ref 0.6–1.3)
ERYTHROCYTE [DISTWIDTH] IN BLOOD BY AUTOMATED COUNT: 12.5 % (ref 11.6–15.1)
GFR SERPL CREATININE-BSD FRML MDRD: 96 ML/MIN/1.73SQ M
GLUCOSE SERPL-MCNC: 98 MG/DL (ref 65–140)
HCT VFR BLD AUTO: 44.7 % (ref 34.8–46.1)
HGB BLD-MCNC: 15.2 G/DL (ref 11.5–15.4)
MCH RBC QN AUTO: 30 PG (ref 26.8–34.3)
MCHC RBC AUTO-ENTMCNC: 34 G/DL (ref 31.4–37.4)
MCV RBC AUTO: 88 FL (ref 82–98)
PLATELET # BLD AUTO: 258 THOUSANDS/UL (ref 149–390)
PMV BLD AUTO: 10.1 FL (ref 8.9–12.7)
POTASSIUM SERPL-SCNC: 3.9 MMOL/L (ref 3.5–5.3)
PROT SERPL-MCNC: 7.1 G/DL (ref 6.4–8.4)
RBC # BLD AUTO: 5.07 MILLION/UL (ref 3.81–5.12)
SODIUM SERPL-SCNC: 140 MMOL/L (ref 135–147)
WBC # BLD AUTO: 8.24 THOUSAND/UL (ref 4.31–10.16)

## 2023-05-09 LAB
C3 SERPL-MCNC: 131 MG/DL (ref 90–180)
C4 SERPL-MCNC: 31 MG/DL (ref 10–40)

## 2023-08-14 ENCOUNTER — APPOINTMENT (OUTPATIENT)
Dept: LAB | Facility: HOSPITAL | Age: 58
End: 2023-08-14
Payer: COMMERCIAL

## 2023-08-14 DIAGNOSIS — M35.00 SICCA SYNDROME (HCC): ICD-10-CM

## 2023-08-14 DIAGNOSIS — E55.9 AVITAMINOSIS D: ICD-10-CM

## 2023-08-14 LAB
25(OH)D3 SERPL-MCNC: 41.1 NG/ML (ref 30–100)
ALBUMIN SERPL BCP-MCNC: 4.6 G/DL (ref 3.5–5)
ALP SERPL-CCNC: 81 U/L (ref 34–104)
ALT SERPL W P-5'-P-CCNC: 14 U/L (ref 7–52)
ANION GAP SERPL CALCULATED.3IONS-SCNC: 8 MMOL/L
AST SERPL W P-5'-P-CCNC: 17 U/L (ref 13–39)
BASOPHILS # BLD AUTO: 0.1 THOUSANDS/ÂΜL (ref 0–0.1)
BASOPHILS NFR BLD AUTO: 2 % (ref 0–1)
BILIRUB SERPL-MCNC: 0.87 MG/DL (ref 0.2–1)
BILIRUB UR QL STRIP: NEGATIVE
BUN SERPL-MCNC: 15 MG/DL (ref 5–25)
C3 SERPL-MCNC: 119 MG/DL (ref 90–180)
C4 SERPL-MCNC: 27 MG/DL (ref 10–40)
CALCIUM SERPL-MCNC: 10 MG/DL (ref 8.4–10.2)
CHLORIDE SERPL-SCNC: 101 MMOL/L (ref 96–108)
CLARITY UR: NORMAL
CO2 SERPL-SCNC: 33 MMOL/L (ref 21–32)
COLOR UR: NORMAL
CREAT SERPL-MCNC: 0.87 MG/DL (ref 0.6–1.3)
EOSINOPHIL # BLD AUTO: 0.28 THOUSAND/ÂΜL (ref 0–0.61)
EOSINOPHIL NFR BLD AUTO: 4 % (ref 0–6)
ERYTHROCYTE [DISTWIDTH] IN BLOOD BY AUTOMATED COUNT: 12.1 % (ref 11.6–15.1)
GFR SERPL CREATININE-BSD FRML MDRD: 75 ML/MIN/1.73SQ M
GLUCOSE P FAST SERPL-MCNC: 81 MG/DL (ref 65–99)
GLUCOSE UR STRIP-MCNC: NEGATIVE MG/DL
HCT VFR BLD AUTO: 47 % (ref 34.8–46.1)
HGB BLD-MCNC: 16 G/DL (ref 11.5–15.4)
HGB UR QL STRIP.AUTO: NEGATIVE
IMM GRANULOCYTES # BLD AUTO: 0.02 THOUSAND/UL (ref 0–0.2)
IMM GRANULOCYTES NFR BLD AUTO: 0 % (ref 0–2)
KETONES UR STRIP-MCNC: NEGATIVE MG/DL
LEUKOCYTE ESTERASE UR QL STRIP: NEGATIVE
LYMPHOCYTES # BLD AUTO: 2.32 THOUSANDS/ÂΜL (ref 0.6–4.47)
LYMPHOCYTES NFR BLD AUTO: 34 % (ref 14–44)
MCH RBC QN AUTO: 29.7 PG (ref 26.8–34.3)
MCHC RBC AUTO-ENTMCNC: 34 G/DL (ref 31.4–37.4)
MCV RBC AUTO: 87 FL (ref 82–98)
MONOCYTES # BLD AUTO: 0.66 THOUSAND/ÂΜL (ref 0.17–1.22)
MONOCYTES NFR BLD AUTO: 10 % (ref 4–12)
NEUTROPHILS # BLD AUTO: 3.39 THOUSANDS/ÂΜL (ref 1.85–7.62)
NEUTS SEG NFR BLD AUTO: 50 % (ref 43–75)
NITRITE UR QL STRIP: NEGATIVE
NRBC BLD AUTO-RTO: 0 /100 WBCS
PH UR STRIP.AUTO: 7 [PH]
PLATELET # BLD AUTO: 264 THOUSANDS/UL (ref 149–390)
PMV BLD AUTO: 10.4 FL (ref 8.9–12.7)
POTASSIUM SERPL-SCNC: 3.7 MMOL/L (ref 3.5–5.3)
PROT SERPL-MCNC: 7.1 G/DL (ref 6.4–8.4)
PROT UR STRIP-MCNC: NEGATIVE MG/DL
RBC # BLD AUTO: 5.38 MILLION/UL (ref 3.81–5.12)
SODIUM SERPL-SCNC: 142 MMOL/L (ref 135–147)
SP GR UR STRIP.AUTO: 1.01 (ref 1–1.03)
UROBILINOGEN UR STRIP-ACNC: <2 MG/DL
WBC # BLD AUTO: 6.77 THOUSAND/UL (ref 4.31–10.16)

## 2023-08-14 PROCEDURE — 81003 URINALYSIS AUTO W/O SCOPE: CPT

## 2023-08-14 PROCEDURE — 85025 COMPLETE CBC W/AUTO DIFF WBC: CPT

## 2023-08-14 PROCEDURE — 82306 VITAMIN D 25 HYDROXY: CPT

## 2023-08-14 PROCEDURE — 80053 COMPREHEN METABOLIC PANEL: CPT

## 2023-08-14 PROCEDURE — 86160 COMPLEMENT ANTIGEN: CPT

## 2023-08-14 PROCEDURE — 36415 COLL VENOUS BLD VENIPUNCTURE: CPT

## 2023-09-13 ENCOUNTER — TELEPHONE (OUTPATIENT)
Dept: NEUROLOGY | Facility: CLINIC | Age: 58
End: 2023-09-13

## 2023-09-13 NOTE — TELEPHONE ENCOUNTER
PCP called in with Pt. pt never had her MRI done and they are asking if it can be reordered so she can have it completed and then come in for a f/u appt. Please assist. And call pt with next step.    Thank you

## 2023-09-13 NOTE — TELEPHONE ENCOUNTER
This patient has not been seen in over 2 years. MRI cannot be ordered without a recent visit to document medical necessity of the imaging.

## 2023-09-14 NOTE — TELEPHONE ENCOUNTER
received vm from 9:19am-A hi, this is Oswaldo Hernandez,  birthday [birth year], phone number [phone]. I am just following up on a call, my doctor's office, my primary doctors office called yesterday regarding an authorization for an MRI. If someone could call me back, I'd like to get that authorization and schedule both the MRI and my appointment with Dr. Amie Mackenzie. Thank you  --------------------------------------------------  Called pt.   Made her aware of below  Scheduled pt to see cornell on 9/29  Added to the wait list     States that she has new insurance   [facility]  [employer] [id number]  [id number]    This info is already noted in chart

## 2023-09-20 ENCOUNTER — OFFICE VISIT (OUTPATIENT)
Dept: NEUROLOGY | Facility: CLINIC | Age: 58
End: 2023-09-20
Payer: COMMERCIAL

## 2023-09-20 VITALS
DIASTOLIC BLOOD PRESSURE: 70 MMHG | SYSTOLIC BLOOD PRESSURE: 110 MMHG | OXYGEN SATURATION: 98 % | HEIGHT: 63 IN | HEART RATE: 96 BPM | WEIGHT: 127 LBS | RESPIRATION RATE: 18 BRPM | TEMPERATURE: 97.2 F | BODY MASS INDEX: 22.5 KG/M2

## 2023-09-20 DIAGNOSIS — G62.9 NEUROPATHY: ICD-10-CM

## 2023-09-20 DIAGNOSIS — R93.0 ABNORMAL MRI OF HEAD: Primary | ICD-10-CM

## 2023-09-20 DIAGNOSIS — G25.81 RESTLESS LEG SYNDROME: ICD-10-CM

## 2023-09-20 DIAGNOSIS — G47.9 SLEEP DISTURBANCE: ICD-10-CM

## 2023-09-20 DIAGNOSIS — M35.00 SJOGREN'S SYNDROME WITHOUT EXTRAGLANDULAR INVOLVEMENT (HCC): ICD-10-CM

## 2023-09-20 PROCEDURE — 99214 OFFICE O/P EST MOD 30 MIN: CPT | Performed by: PHYSICIAN ASSISTANT

## 2023-09-20 NOTE — PATIENT INSTRUCTIONS
Referral to sleep medicine  EMG of the lower extremities  MRI of the brain without contrast  Start B12 supplement over the counter 1000mcg daily  Continue to follow with rheumatology for management of Sjogren's  Follow up in 2 months after the MRI
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
## 2023-09-21 NOTE — ASSESSMENT & PLAN NOTE
Prior EMGs in 2015 of both UE and LEs did not demonstrate large fiber neuropathy. She has ongoing neuropathic symptoms in her legs, is on duloxetine through rheumatology. She was previously on gabapentin but discontinued due to side effects. She reports some increased neuropathic pain in her feet. Discussed we can update EMG, especially given her new symptoms of muscle twitching. She likely has small fiber neuropathy related to her rheumatologic disorder. Treatment will be symptomatic. She is already on duloxetine. TCAs need to be avoided due to her dry mouth already due to Sjogren's. B12 noted to be low recently at 334.   Suggested starting OTC B12 1000mcg daily

## 2023-09-21 NOTE — ASSESSMENT & PLAN NOTE
Patient describes symptoms of frequent nighttime awakenings (approximately 10 times per night), excessive daytime fatigue, brain fog, feeling clumsy during the day (no coordination deficits on neurologic exam however), RLS symptoms in the evening. She tried ropinirole through her PCP which she says caused her to wake up even more times during the night. She was previously tried on gabapentin but had side effects. We discussed many of the symptoms she is complaining of during the day may be related to her poor sleep at night. I am suggesting referral to sleep medicine for evaluation. She is agreeable and the referral was placed.

## 2023-09-21 NOTE — ASSESSMENT & PLAN NOTE
Will update MRI brain for surveillance, last completed 5/2021. She has some new symptoms over the last few months of feeling clumsy, bumping into things, muscle twitching. I do feel most of this is related to her poor sleep, exhaustion, likely underlying sleep disorder. See more below. Her neurologic exam is stable today.

## 2023-10-03 ENCOUNTER — OFFICE VISIT (OUTPATIENT)
Age: 58
End: 2023-10-03
Payer: COMMERCIAL

## 2023-10-03 VITALS
HEIGHT: 63 IN | HEART RATE: 97 BPM | OXYGEN SATURATION: 99 % | DIASTOLIC BLOOD PRESSURE: 82 MMHG | WEIGHT: 129.2 LBS | RESPIRATION RATE: 16 BRPM | BODY MASS INDEX: 22.89 KG/M2 | SYSTOLIC BLOOD PRESSURE: 114 MMHG

## 2023-10-03 DIAGNOSIS — K21.9 GASTROESOPHAGEAL REFLUX DISEASE, UNSPECIFIED WHETHER ESOPHAGITIS PRESENT: ICD-10-CM

## 2023-10-03 DIAGNOSIS — K58.9 IRRITABLE BOWEL SYNDROME, UNSPECIFIED TYPE: Primary | ICD-10-CM

## 2023-10-03 PROCEDURE — 99204 OFFICE O/P NEW MOD 45 MIN: CPT | Performed by: PHYSICIAN ASSISTANT

## 2023-10-03 RX ORDER — FAMOTIDINE 40 MG/1
TABLET, FILM COATED ORAL
COMMUNITY
Start: 2023-07-30

## 2023-10-03 RX ORDER — MELOXICAM 7.5 MG/1
TABLET ORAL
COMMUNITY
Start: 2023-09-19

## 2023-10-03 RX ORDER — HYDROQUINONE 40 MG/G
CREAM TOPICAL
COMMUNITY
Start: 2023-09-15

## 2023-10-03 NOTE — PROGRESS NOTES
[facility] - Outpatient Consultation  [facility] 56 [facility] MRN: [id number]  Encounter: [id number]          ASSESSMENT AND PLAN:      1. Irritable bowel syndrome  2. Gastroesophageal reflux disease    Patient presents to establish GI care. She has a history of IBS, GERD, and SIBO and previously followed with a GI in [facility]. Per review of records she had a colonoscopy with visualization of the terminal ileum in May of 2021 which showed a 6mm adenoma that was removed and hemorrhoids (repeat colonoscopy recommended in 6 years - 5/2026). She also had an EGD in May of 2021 which was normal and biopsies were negative for h pylori and celiac. She also previously had negative celiac serology. She was previously treated for SIBO with doxycycline per records in 2021. She reports her GERD symptoms are overall controlled on Omeprazole 40mg po BID and Pepcid 40mg BID. She still has some issues with her IBS with bloating and BM irregularities. She is on a modified low FODMAP diet. She is gluten-free and lactose-free. She cannot take Bentyl due to her Sjogren's. Recommend she utilize a fiber supplement daily. Will give a Xifaxan 550mg po TID x 14 days course for her IBS. Continue the low FODMAP/gluten-free/lactose-free diet. Recommend she try to wean down on her GERD regimen. She will first decrease the Pepcid to just once a day at bedtime. If tolerating this change, she will then decrease her Omeprazole to just daily as well.  ______________________________________________________________________    HPI:  Patient is a pleasant 56year old female with a PMH of IC, Sjögren's, IBS, and GERD who presents to the office to establish GI care. She previously followed with a GI in [facility].   Per review of records she had a colonoscopy with visualization of the terminal ileum in May of 2021 which showed a 6mm adenoma that was removed and hemorrhoids (repeat colonoscopy recommended in 6 years - 5/2026). She also had an EGD in May of 2021 which was normal and biopsies were negative for h pylori and celiac. She also previously had negative celiac serology. She was previously treated for SIBO with doxycycline per records in 2021. She reports her GERD symptoms are overall controlled on Omeprazole 40mg po BID and Pepcid 40mg BID. She still has some issues with her IBS with bloating and BM irregularities. She is on a modified low FODMAP diet. She is gluten-free and lactose-free. REVIEW OF SYSTEMS:    CONSTITUTIONAL: Denies any fever, chills, rigors, and weight loss. HEENT: No earache or tinnitus. Denies hearing loss or visual disturbances. CARDIOVASCULAR: No chest pain or palpitations. RESPIRATORY: Denies any cough, hemoptysis, shortness of breath or dyspnea on exertion. GASTROINTESTINAL: As noted in the History of Present Illness. GENITOURINARY: No problems with urination. Denies any hematuria or dysuria. NEUROLOGIC: No dizziness or vertigo, denies headaches. MUSCULOSKELETAL: Denies any muscle or joint pain. SKIN: Denies skin rashes or itching. ENDOCRINE: Denies excessive thirst. Denies intolerance to heat or cold. PSYCHOSOCIAL: Denies depression or anxiety. Denies any recent memory loss.        Historical Information   Past Medical History:   Diagnosis Date   • GERD (gastroesophageal reflux disease)    • Irritable bowel syndrome with constipation    • Sjogren's disease ([facility]) 02/2022     Past Surgical History:   Procedure Laterality Date   • APPENDECTOMY  1997   • KNEE SURGERY Right 2000     Social History   Social History     Substance and Sexual Activity   Alcohol Use Never     Social History     Substance and Sexual Activity   Drug Use Never     Social History     Tobacco Use   Smoking Status Never   Smokeless Tobacco Never     Family History   Adopted: Yes   Family history unknown: Yes       Meds/Allergies       Current Outpatient Medications:   •  albuterol (PROVENTIL HFA,VENTOLIN HFA) 90 mcg/act inhaler  •  Azelastine HCl 0.15 % SOLN  •  Calcium Carbonate 1500 (600 Ca) MG TABS  •  Cetirizine HCl (ZyrTEC Allergy) 10 MG CAPS  •  chloroquine (ARALEN) 250 MG tablet  •  Cyanocobalamin (VITAMIN B12 PO)  •  cycloSPORINE (RESTASIS) 0.05 % ophthalmic emulsion  •  Diclofenac Sodium (VOLTAREN) 1 %  •  DULoxetine (CYMBALTA) 60 mg delayed release capsule  •  ergocalciferol (VITAMIN D2) 50,000 units  •  Evening Primrose Oil 500 MG CAPS  •  famotidine (PEPCID) 40 MG tablet  •  hydroquinone 4 % cream  •  Klor-Con M10 10 MEQ tablet  •  meloxicam (MOBIC) 7.5 mg tablet  •  montelukast (SINGULAIR) 10 mg tablet  •  omeprazole (PriLOSEC) 40 MG capsule  •  polyethylene glycol (GLYCOLAX) 17 GM/SCOOP powder  •  rifaximin (XIFAXAN) 550 mg tablet  •  vitamin E 100 UNIT capsule  •  fluticasone-umeclidinium-vilanterol (Trelegy Ellipta) 100-62.5-25 mcg/actuation inhaler    Allergies   Allergen Reactions   • Clindamycin Hives and Other (See Comments)   • Naproxen Hives and Other (See Comments)   • Penicillins Hives, Itching, Other (See Comments), Rash and Edema   • Gabapentin Other (See Comments)   • Latex Itching   • Rofecoxib Other (See Comments)   • Sulindac Other (See Comments)   • Tramadol Syncope   • Bacitracin Rash   • Gadolinium Derivatives Itching and Rash     Action Taken: Medrol & Benadryl Prep Prior to MRIs;    • Gluten Meal - Food Allergy Rash   • Lamotrigine Other (See Comments) and Rash   • Medical Tape Rash   • Oxcarbazepine Hives, Itching, Other (See Comments) and Rash   • Shellfish-Derived Products - Food Allergy Hives   • Sulfamethoxazole-Trimethoprim Hives, Rash, Other (See Comments) and Edema         • Tiagabine Other (See Comments) and Rash   • Zonisamide Other (See Comments) and Rash           Objective     Blood pressure 114/82, pulse 97, resp. rate 16, height 5' 3" (1.6 m), weight 58.6 kg (129 lb 3.2 oz), SpO2 99 %. Body mass index is 22.89 kg/m².         PHYSICAL EXAM:      General Appearance:   Alert, cooperative, no distress   HEENT:   Normocephalic, atraumatic, anicteric.     Neck:  Supple, symmetrical, trachea midline   Lungs:   Clear to auscultation bilaterally; no rales, rhonchi or wheezing; respirations unlabored    Heart[de-identified]   Regular rate and rhythm; no murmur, rub, or gallop. Abdomen:   Soft, non-tender, non-distended; normal bowel sounds; no masses, no organomegaly    Genitalia:   Deferred    Rectal:   Deferred    Extremities:  No cyanosis, clubbing or edema    Pulses:  2+ and symmetric    Skin:  No jaundice, rashes, or lesions    Lymph nodes:  No palpable cervical lymphadenopathy        Lab Results:   No visits with results within 1 Day(s) from this visit.    Latest known visit with results is:   Appointment on 08/14/2023   Component Date Value   • Sodium 08/14/2023 142    • Potassium 08/14/2023 3.7    • Chloride 08/14/2023 101    • CO2 08/14/2023 33 (H)    • ANION GAP 08/14/2023 8    • BUN 08/14/2023 15    • Creatinine 08/14/2023 0.87    • Glucose, Fasting 08/14/2023 81    • Calcium 08/14/2023 10.0    • AST 08/14/2023 17    • ALT 08/14/2023 14    • Alkaline Phosphatase 08/14/2023 81    • Total Protein 08/14/2023 7.1    • Albumin 08/14/2023 4.6    • Total Bilirubin 08/14/2023 0.87    • eGFR 08/14/2023 75    • WBC 08/14/2023 6.77    • RBC 08/14/2023 5.38 (H)    • Hemoglobin 08/14/2023 16.0 (H)    • Hematocrit 08/14/2023 47.0 (H)    • MCV 08/14/2023 87    • MCH 08/14/2023 29.7    • MCHC 08/14/2023 34.0    • RDW 08/14/2023 12.1    • MPV 08/14/2023 10.4    • Platelets 98/96/3971 264    • nRBC 08/14/2023 0    • Neutrophils Relative 08/14/2023 50    • Immat GRANS % 08/14/2023 0    • Lymphocytes Relative 08/14/2023 34    • Monocytes Relative 08/14/2023 10    • Eosinophils Relative 08/14/2023 4    • Basophils Relative 08/14/2023 2 (H)    • Neutrophils Absolute 08/14/2023 3.39    • Immature Grans Absolute 08/14/2023 0.02    • Lymphocytes Absolute 08/14/2023 2.32    • Monocytes Absolute 08/14/2023 0.66    • Eosinophils Absolute 08/14/2023 0.28    • Basophils Absolute 08/14/2023 0.10    • Vit D, 25-Hydroxy 08/14/2023 41.1    • C3 Complement 08/14/2023 119.0    • C4, COMPLEMENT 08/14/2023 27.0    • Color, UA 08/14/2023 Light Yellow    • Clarity, UA 08/14/2023 Turbid    • Specific Gravity, UA 08/14/2023 1.013    • pH, UA 08/14/2023 7.0    • Leukocytes, UA 08/14/2023 Negative    • Nitrite, UA 08/14/2023 Negative    • Protein, UA 08/14/2023 Negative    • Glucose, UA 08/14/2023 Negative    • Ketones, UA 08/14/2023 Negative    • Urobilinogen, UA 08/14/2023 <2.0    • Bilirubin, UA 08/14/2023 Negative    • Occult Blood, UA 08/14/2023 Negative          Radiology Results:   No results found.

## 2023-10-03 NOTE — LETTER
October 5, 2023     Papito Pyle PA-C  [address]    Patient: Leslie Espinoza   YOB: 1965   Date of Visit: 10/3/2023       Dear Dr. De Guzman Ask: Thank you for referring Tony Hilario to me for evaluation. Below are my notes for this consultation. If you have questions, please do not hesitate to call me. I look forward to following your patient along with you. Sincerely,        Monique Agosto PA-C        CC: No Recipients    Tajjv Agosto [place]  10/3/2023 10:44 AM  Attested  [facility] - Outpatient Consultation  Leslie Espinoza 56 y.o. female MRN: [id number]  Encounter: [id number]          ASSESSMENT AND PLAN:      1. Irritable bowel syndrome  2. Gastroesophageal reflux disease    Patient presents to establish GI care. She has a history of IBS, GERD, and SIBO and previously followed with a GI in [facility]. Per review of records she had a colonoscopy with visualization of the terminal ileum in May of 2021 which showed a 6mm adenoma that was removed and hemorrhoids (repeat colonoscopy recommended in 6 years - 5/2026). She also had an EGD in May of 2021 which was normal and biopsies were negative for h pylori and celiac. She also previously had negative celiac serology. She was previously treated for SIBO with doxycycline per records in 2021. She reports her GERD symptoms are overall controlled on Omeprazole 40mg po BID and Pepcid 40mg BID. She still has some issues with her IBS with bloating and BM irregularities. She is on a modified low FODMAP diet. She is gluten-free and lactose-free. She cannot take Bentyl due to her Sjogren's. Recommend she utilize a fiber supplement daily. Will give a Xifaxan 550mg po TID x 14 days course for her IBS. Continue the low FODMAP/gluten-free/lactose-free diet. Recommend she try to wean down on her GERD regimen.   She will first decrease the Pepcid to just once a day at bedtime. If tolerating this change, she will then decrease her Omeprazole to just daily as well.  ______________________________________________________________________    HPI:  Patient is a pleasant 56year old female with a PMH of IC, Sjögren's, IBS, and GERD who presents to the office to establish GI care. She previously followed with a GI in [facility]. Per review of records she had a colonoscopy with visualization of the terminal ileum in May of 2021 which showed a 6mm adenoma that was removed and hemorrhoids (repeat colonoscopy recommended in 6 years - 5/2026). She also had an EGD in May of 2021 which was normal and biopsies were negative for h pylori and celiac. She also previously had negative celiac serology. She was previously treated for SIBO with doxycycline per records in 2021. She reports her GERD symptoms are overall controlled on Omeprazole 40mg po BID and Pepcid 40mg BID. She still has some issues with her IBS with bloating and BM irregularities. She is on a modified low FODMAP diet. She is gluten-free and lactose-free. REVIEW OF SYSTEMS:    CONSTITUTIONAL: Denies any fever, chills, rigors, and weight loss. HEENT: No earache or tinnitus. Denies hearing loss or visual disturbances. CARDIOVASCULAR: No chest pain or palpitations. RESPIRATORY: Denies any cough, hemoptysis, shortness of breath or dyspnea on exertion. GASTROINTESTINAL: As noted in the History of Present Illness. GENITOURINARY: No problems with urination. Denies any hematuria or dysuria. NEUROLOGIC: No dizziness or vertigo, denies headaches. MUSCULOSKELETAL: Denies any muscle or joint pain. SKIN: Denies skin rashes or itching. ENDOCRINE: Denies excessive thirst. Denies intolerance to heat or cold. PSYCHOSOCIAL: Denies depression or anxiety. Denies any recent memory loss.        Historical Information   Past Medical History:   Diagnosis Date   • GERD (gastroesophageal reflux disease)    • Irritable bowel syndrome with constipation    • Sjogren's disease ([address] [facility]) 02/2022     Past Surgical History:   Procedure Laterality Date   • APPENDECTOMY  1997   • KNEE SURGERY Right 2000     Social History   Social History     Substance and Sexual Activity   Alcohol Use Never     Social History     Substance and Sexual Activity   Drug Use Never     Social History     Tobacco Use   Smoking Status Never   Smokeless Tobacco Never     Family History   Adopted: Yes   Family history unknown: Yes       Meds/Allergies       Current Outpatient Medications:   •  albuterol (PROVENTIL HFA,VENTOLIN HFA) 90 mcg/act inhaler  •  Azelastine HCl 0.15 % SOLN  •  Calcium Carbonate 1500 (600 Ca) MG TABS  •  Cetirizine HCl (ZyrTEC Allergy) 10 MG CAPS  •  chloroquine (ARALEN) 250 MG tablet  •  Cyanocobalamin (VITAMIN B12 PO)  •  cycloSPORINE (RESTASIS) 0.05 % ophthalmic emulsion  •  Diclofenac Sodium (VOLTAREN) 1 %  •  DULoxetine (CYMBALTA) 60 mg delayed release capsule  •  ergocalciferol (VITAMIN D2) 50,000 units  •  Evening Primrose Oil 500 MG CAPS  •  famotidine (PEPCID) 40 MG tablet  •  hydroquinone 4 % cream  •  Klor-Con M10 10 MEQ tablet  •  meloxicam (MOBIC) 7.5 mg tablet  •  montelukast (SINGULAIR) 10 mg tablet  •  omeprazole (PriLOSEC) 40 MG capsule  •  polyethylene glycol (GLYCOLAX) 17 GM/SCOOP powder  •  rifaximin (XIFAXAN) 550 mg tablet  •  vitamin E 100 UNIT capsule  •  fluticasone-umeclidinium-vilanterol (Trelegy Ellipta) 100-62.5-25 mcg/actuation inhaler    Allergies   Allergen Reactions   • Clindamycin Hives and Other (See Comments)   • Naproxen Hives and Other (See Comments)   • Penicillins Hives, Itching, Other (See Comments), Rash and Edema   • Gabapentin Other (See Comments)   • Latex Itching   • Rofecoxib Other (See Comments)   • Sulindac Other (See Comments)   • Tramadol Syncope   • Bacitracin Rash   • Gadolinium Derivatives Itching and Rash     Action Taken: Medrol & Benadryl Prep Prior to MRIs;    • Gluten Meal - Food Allergy Rash   • Lamotrigine Other (See Comments) and Rash   • Medical Tape Rash   • Oxcarbazepine Hives, Itching, Other (See Comments) and Rash   • Shellfish-Derived Products - Food Allergy Hives   • Sulfamethoxazole-Trimethoprim Hives, Rash, Other (See Comments) and Edema         • Tiagabine Other (See Comments) and Rash   • Zonisamide Other (See Comments) and Rash           Objective     Blood pressure 114/82, pulse 97, resp. rate 16, height 5' 3" (1.6 m), weight 58.6 kg (129 lb 3.2 oz), SpO2 99 %. Body mass index is 22.89 kg/m². PHYSICAL EXAM:      General Appearance:   Alert, cooperative, no distress   HEENT:   Normocephalic, atraumatic, anicteric. Neck:  Supple, symmetrical, trachea midline   Lungs:   Clear to auscultation bilaterally; no rales, rhonchi or wheezing; respirations unlabored    Heart[de-identified]   Regular rate and rhythm; no murmur, rub, or gallop. Abdomen:   Soft, non-tender, non-distended; normal bowel sounds; no masses, no organomegaly    Genitalia:   Deferred    Rectal:   Deferred    Extremities:  No cyanosis, clubbing or edema    Pulses:  2+ and symmetric    Skin:  No jaundice, rashes, or lesions    Lymph nodes:  No palpable cervical lymphadenopathy        Lab Results:   No visits with results within 1 Day(s) from this visit.    Latest known visit with results is:   Appointment on 08/14/2023   Component Date Value   • Sodium 08/14/2023 142    • Potassium 08/14/2023 3.7    • Chloride 08/14/2023 101    • CO2 08/14/2023 33 (H)    • ANION GAP 08/14/2023 8    • BUN 08/14/2023 15    • Creatinine 08/14/2023 0.87    • Glucose, Fasting 08/14/2023 81    • Calcium 08/14/2023 10.0    • AST 08/14/2023 17    • ALT 08/14/2023 14    • Alkaline Phosphatase 08/14/2023 81    • Total Protein 08/14/2023 7.1    • Albumin 08/14/2023 4.6    • Total Bilirubin 08/14/2023 0.87    • eGFR 08/14/2023 75    • WBC 08/14/2023 6.77    • RBC 08/14/2023 5.38 (H)    • Hemoglobin 08/14/2023 16.0 (H)    • Hematocrit 08/14/2023 47.0 (H)    • MCV 08/14/2023 87    • MCH 08/14/2023 29.7    • MCHC 08/14/2023 34.0    • RDW 08/14/2023 12.1    • MPV 08/14/2023 10.4    • Platelets 86/10/8006 264    • nRBC 08/14/2023 0    • Neutrophils Relative 08/14/2023 50    • Immat GRANS % 08/14/2023 0    • Lymphocytes Relative 08/14/2023 34    • Monocytes Relative 08/14/2023 10    • Eosinophils Relative 08/14/2023 4    • Basophils Relative 08/14/2023 2 (H)    • Neutrophils Absolute 08/14/2023 3.39    • Immature Grans Absolute 08/14/2023 0.02    • Lymphocytes Absolute 08/14/2023 2.32    • Monocytes Absolute 08/14/2023 0.66    • Eosinophils Absolute 08/14/2023 0.28    • Basophils Absolute 08/14/2023 0.10    • Vit D, 25-Hydroxy 08/14/2023 41.1    • C3 Complement 08/14/2023 119.0    • C4, COMPLEMENT 08/14/2023 27.0    • Color, UA 08/14/2023 Light Yellow    • Clarity, UA 08/14/2023 Turbid    • Specific Gravity, UA 08/14/2023 1.013    • pH, UA 08/14/2023 7.0    • Leukocytes, UA 08/14/2023 Negative    • Nitrite, UA 08/14/2023 Negative    • Protein, UA 08/14/2023 Negative    • Glucose, UA 08/14/2023 Negative    • Ketones, UA 08/14/2023 Negative    • Urobilinogen, UA 08/14/2023 <2.0    • Bilirubin, UA 08/14/2023 Negative    • Occult Blood, UA 08/14/2023 Negative          Radiology Results:   No results found. Attestation signed by Rona Dance, MD at 10/3/2023 11:20 AM:  I supervised the Advanced Practitioner. I reviewed the Advanced Practitioner note and agree.     Rona Dance, MD 10/03/23

## 2023-10-12 ENCOUNTER — HOSPITAL ENCOUNTER (OUTPATIENT)
Dept: MRI IMAGING | Facility: HOSPITAL | Age: 58
End: 2023-10-12
Payer: COMMERCIAL

## 2023-10-12 DIAGNOSIS — R93.0 ABNORMAL MRI OF HEAD: ICD-10-CM

## 2023-10-12 PROCEDURE — 70551 MRI BRAIN STEM W/O DYE: CPT

## 2023-10-12 PROCEDURE — G1004 CDSM NDSC: HCPCS

## 2023-10-16 ENCOUNTER — OFFICE VISIT (OUTPATIENT)
Dept: NEUROLOGY | Facility: CLINIC | Age: 58
End: 2023-10-16
Payer: COMMERCIAL

## 2023-10-16 VITALS
SYSTOLIC BLOOD PRESSURE: 120 MMHG | WEIGHT: 129.6 LBS | BODY MASS INDEX: 22.96 KG/M2 | HEART RATE: 95 BPM | HEIGHT: 63 IN | DIASTOLIC BLOOD PRESSURE: 70 MMHG

## 2023-10-16 DIAGNOSIS — R06.83 SNORING: ICD-10-CM

## 2023-10-16 DIAGNOSIS — F51.04 CHRONIC INSOMNIA: ICD-10-CM

## 2023-10-16 DIAGNOSIS — G47.9 SLEEP DISTURBANCE: ICD-10-CM

## 2023-10-16 DIAGNOSIS — G25.81 RESTLESS LEG SYNDROME: ICD-10-CM

## 2023-10-16 DIAGNOSIS — G47.19 EXCESSIVE DAYTIME SLEEPINESS: Primary | ICD-10-CM

## 2023-10-16 PROCEDURE — 99204 OFFICE O/P NEW MOD 45 MIN: CPT | Performed by: INTERNAL MEDICINE

## 2023-10-16 NOTE — PATIENT INSTRUCTIONS
Speak with your rheumatologist about switching to pregabalin (lyrica) instead of duloxetine   Duloxetine can worsen restless legs syndrome and lead to insomnia  I will order in lab sleep study as recent home sleep study did not show obstructive sleep apnea, but you slept poorly during the study        Sleep hygiene tips:    Keep a consistent bedtime and wake up time. This will lead to a more regular sleep schedule and avoid periods of sleep deprivation or periods of extended wakefulness during the night. Avoid napping, especially naps lasting longer than 1 hour or naps late in the day, which will likely affect your ability to fall asleep that night. Limit caffeine, avoiding caffeine after lunch to allow it to get out of your system and not affect your ability to fall asleep or the quality of your sleep    Limit alcohol, alcohol can be sedating but also activating as it metabolizes, causing you to awaken from sleep. It can affect the quality of your sleep by not letting you get into the more refreshing stages of sleep. Avoid nicotine, of course not smoking or vaping at all is best, but nicotine is a stimulant and should be avoided near bedtime and during the night    Exercise, Daytime physical activity is encouraged, in particular 4-6 hours before bedtime, as this may help you to fall asleep more easily and quality of sleep is improved. Rigorous exercise within 3 hours of bedtime is discouraged. Keep the sleep environment quiet and dark - Noise and light exposure during the night can disrupt sleep. White noise or ear plugs are often recommended to reduce noise. Using black out shades or an eye mask is commonly recommended to reduce light. This also includes avoiding exposure to television or technology near bedtime, as this can have an impact on circadian rhythms by shifting sleep time later.     Bedroom clock - Avoid checking the time at night  This includes alarm clocks and other time pieces such as watches and phones. Checking the time increases cognitive arousal and prolongs wakefulness. Evening eating - Avoid a large meal near bedtime, but don't go to bed hungry. Eat a healthy and filling meal in the evening without over-eating and avoid late night snacks. There are some on-line resources that do require a fee that can be of help. Two credible websites are as follows:    Http://[employer]/cbt-online-insomnia-treatment.html    IndoorThe[employer].si    An bridget used by the [facility] is as follows:    CBT-I     Go!  To Sleep by the [facility] [place]

## 2023-10-16 NOTE — PROGRESS NOTES
Sleep Consultation   Jesus Jensen 56 y.o. female MRN: [id number]      Reason for consultation: Restless leg syndrome    Requesting physician: Albertina Yi PA-C    Assessment/Plan    1. Restless leg syndrome  Ferritin 132 on 8/17/2022  Nightly symptoms  Currently taking duloxetine 60 mg twice daily for pain  Prescribed by rheumatologist  She reports pain and muscle jerks in addition to restless legs symptoms  She notes the restless leg symptoms are different from pain and muscle jerks  Gabapentin has caused dizziness in the past and she does not want to try it again  I explained that duloxetine can worsen restless leg syndrome and also lead to insomnia  She has frequent nighttime awakenings due to nocturnal leg movements    Plan  I requested she speak with her neurologist about switching from duloxetine to pregabalin as it would likely improve her RLS symptoms and insomnia  I explained that pregabalin is also a beneficial medication for her pain    2. 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 Suspected sleep apnea  Mallampati class 4, Body mass index is 22.96 kg/m².,  .     He/she is at risk for obstructive sleep apnea based on STOP BANG survey based on snoring, tiredness  S/s: Snoring, tiredness, frequent headaches, frequent nighttime awakenings  [place] score: 12  I discussed in depth the diagnostic studies and treatment options involved with obstructive sleep apnea  I also discussed in depth the risk of leaving sleep apnea untreated including hypertension, heart failure, arrhythmia, MI and stroke. The patient is agreeable to undergo testing and treatment of obstructive sleep apnea. He/she understands the pitfalls he/she may encounter along the way and is willing to attempt CPAP treatment. Home sleep study on 12/27/2021 showed AHI 4.1, patient slept poorly during the study    Plan  As patient has excessive daytime sleepiness and snoring, she would benefit from an in lab diagnostic polysomnogram  She had a recent home sleep study that did not show evidence of obstructive sleep apnea, however as she is symptomatic and there is high clinical suspicion she would benefit from in lab diagnostic polysomnogram  Patient also notes that she slept poorly during the home sleep study  There is also high suspicion for coexistent periodic limb movement disorder, as patient has restless leg symptoms and states that she moves in her sleep and finds her blankets and pillows on the floor    4. Snoring  As above    5. Excessive daytime sleepiness  As above            History of Present Illness   HPI:  Jana Suggs is a 56 y.o. female with PMHx of neuropathy, asthma, allergic rhinitis, Sjogren's syndrome who presents for evaluation of restless leg syndrome. She has nightly restless leg symptoms that are separate from her neuropathy. She frequently tosses and turns at night which prevents her from falling asleep and also staying asleep. She reports at least 10 nighttime awakenings restless leg symptoms have gone on for at least 5 years. She was recently diagnosed with Sjogren syndrome. She is currently on duloxetine 60 mg twice daily for neuropathic pain. Last ferritin was normal in the chart. She works second shift from 12 noon to 9:30 PM.  She generally feels sleepy around 10:30 PM.  She takes melatonin 5 mg at night. She goes to bed but is unable to fall asleep for 2 hours.   After she falls asleep she wakes up at least 10 times at night. She tosses and turns throughout the night. She finds her pillows and blankets are all over the place she states they are like "a hurricane came through"    She has frequent headaches. She feels her concentration is worse. She has excessive daytime sleepiness with an [place] score of 12. She snores at night intermittently. She drinks 8 ounces of coffee daily. She grinds her teeth at night in her sleep. She had a recent home sleep study which did not show evidence of obstructive sleep apnea. Patient states that she slept poorly during the study.           Review of Systems      Genitourinary none   Cardiology none   Gastrointestinal none   Neurology none   Constitutional none   Integumentary none   Psychiatry none   Musculoskeletal none   Pulmonary none   ENT none   Endocrine none   Hematological none         Historical Information   Past Medical History:   Diagnosis Date    GERD (gastroesophageal reflux disease)     Irritable bowel syndrome with constipation     Sjogren's disease ([address] [facility]) 02/2022     Past Surgical History:   Procedure Laterality Date    APPENDECTOMY  1997    KNEE SURGERY Right 2000     Family History   Adopted: Yes   Family history unknown: Yes     Social History     Socioeconomic History    Marital status: Single     Spouse name: Not on file    Number of children: Not on file    Years of education: Not on file    Highest education level: Not on file   Occupational History    Not on file   Tobacco Use    Smoking status: Never    Smokeless tobacco: Never   Vaping Use    Vaping Use: Never used   Substance and Sexual Activity    Alcohol use: Never    Drug use: Never    Sexual activity: Not on file   Other Topics Concern    Not on file   Social History Narrative    Not on file     Social Determinants of Health     Financial Resource Strain: Not on file   Food Insecurity: Not on file   Transportation Needs: Not on file   Physical Activity: Not on file   Stress: Not on file   Social Connections: Not on file   Intimate Partner Violence: Not on file   Housing Stability: Not on file       Occupational History: NA    Meds/Allergies   Allergies   Allergen Reactions    Clindamycin Hives and Other (See Comments)    Naproxen Hives and Other (See Comments)    Penicillins Hives, Itching, Other (See Comments), Rash and Edema    Gabapentin Other (See Comments)    Latex Itching    Rofecoxib Other (See Comments)    Sulindac Other (See Comments)    Tramadol Syncope    Bacitracin Rash    Gadolinium Derivatives Itching and Rash     Action Taken: Medrol & Benadryl Prep Prior to MRIs;     Gluten Meal - Food Allergy Rash    Lamotrigine Other (See Comments) and Rash    Medical Tape Rash    Oxcarbazepine Hives, Itching, Other (See Comments) and Rash    Shellfish-Derived Products - Food Allergy Hives    Sulfamethoxazole-Trimethoprim Hives, Rash, Other (See Comments) and Edema          Tiagabine Other (See Comments) and Rash    Zonisamide Other (See Comments) and Rash       Home medications:  Prior to Admission medications    Medication Sig Start Date End Date Taking?  Authorizing Provider   albuterol (PROVENTIL HFA,VENTOLIN HFA) 90 mcg/act inhaler Inhale 2 puffs   Yes Historical Provider, MD   Azelastine HCl 0.15 % SOLN into each nostril 3/30/23  Yes Historical Provider, MD   Calcium Carbonate 1500 (600 Ca) MG TABS Take 1 tablet by mouth in the morning 11/30/22  Yes Historical Provider, MD   Cetirizine HCl (ZyrTEC Allergy) 10 MG CAPS Take 1 tablet by mouth   Yes Historical Provider, MD   chloroquine (ARALEN) 250 MG tablet Take 250 mg by mouth 3 (three) times a week 3/7/23  Yes Historical Provider, MD   Cyanocobalamin (VITAMIN B12 PO) Take by mouth in the morning   Yes Historical Provider, MD   cycloSPORINE (RESTASIS) 0.05 % ophthalmic emulsion Administer 1 drop to both eyes as needed   Yes Historical Provider, MD   Diclofenac Sodium (VOLTAREN) 1 % APPLY 2 GRAMS TO THE AFFECTED AREA(S) BY TOPICAL ROUTE 2-4 TIMES PER DAY 8/30/23  Yes Historical Provider, MD   DULoxetine (CYMBALTA) 60 mg delayed release capsule Take 60 mg by mouth 2 (two) times a day 11/30/22  Yes Historical Provider, MD   ergocalciferol (VITAMIN D2) 50,000 units  3/6/23  Yes Historical Provider, MD   Evening Primrose Oil 500 MG CAPS Take by mouth   Yes Historical Provider, MD   famotidine (PEPCID) 40 MG tablet TAKE 1 TABLET BY MOUTH IN THE MORNING AND BEFORE BEDTIME 7/30/23  Yes Historical Provider, MD   fluticasone-umeclidinium-vilanterol (Trelegy Ellipta) 100-62.5-25 mcg/actuation inhaler Inhale 1 puff daily Rinse mouth after use. 4/4/23 10/16/23 Yes YOLY Bates   hydroquinone 4 % cream APPLY TO DARK SPOTS ONCE DAILY X8-12WEEKS. SPOT TREATMENT ONLY. USE SUNSCREEN 9/15/23  Yes Historical Provider, MD   Klor-Con M10 10 MEQ tablet Take 30 mEq by mouth daily 1/24/23  Yes Historical Provider, MD   meloxicam (MOBIC) 7.5 mg tablet TAKE 1 TABLET BY MOUTH WITH FOOD EVERY 12 HOURS AS NEEDED FOR PAIN 9/19/23  Yes Historical Provider, MD   montelukast (SINGULAIR) 10 mg tablet TAKE 1 TABLET BY MOUTH EVERY DAY 8/21/23  Yes YOLY Bates   omeprazole (PriLOSEC) 40 MG capsule 40 mg 2 (two) times a day 4/1/23  Yes Historical Provider, MD   polyethylene glycol (GLYCOLAX) 17 GM/SCOOP powder Take 17 g by mouth daily   Yes Historical Provider, MD   vitamin E 100 UNIT capsule Take 100 Units by mouth   Yes Historical Provider, MD   rifaximin (XIFAXAN) 550 mg tablet Take 1 tablet (550 mg total) by mouth every 8 (eight) hours for 14 days  Patient not taking: Reported on 10/16/2023 10/3/23 10/17/23  Chelle Rangel PA-C       Vitals:   Blood pressure 120/70, pulse 95, height 5' 3" (1.6 m), weight 58.8 kg (129 lb 9.6 oz). , Neck circumference 14 inches, Body mass index is 22.96 kg/m².        Physical Exam  General: Awake alert and oriented x 3, conversant without conversational dyspnea, NAD, normal affect  HEENT:  PERRL, Sclera noninjected, nonicteric OU, Nares patent,  no craniofacial abnormalities, Mucous membranes, moist, no oral lesions, normal dentition, Mallampati class 4  NECK:  Trachea midline, no accessory muscle use, no stridor, no cervical or supraclavicular adenopathy, JVP not elevated  CARDIAC: Reg, single s1/S2, no m/r/g  PULM: CTA bilaterally no wheezing, rhonchi or rales  EXT: No cyanosis, no clubbing, no edema, normal capillary refill  NEURO: no focal neurologic deficits, AAOx3, moving all extremities appropriately    Labs: I have personally reviewed pertinent lab results.   Lab Results   Component Value Date    WBC 6.77 08/14/2023    HGB 16.0 (H) 08/14/2023    HCT 47.0 (H) 08/14/2023    MCV 87 08/14/2023     08/14/2023      Lab Results   Component Value Date    CALCIUM 10.0 08/14/2023    K 3.7 08/14/2023    CO2 33 (H) 08/14/2023     08/14/2023    BUN 15 08/14/2023    CREATININE 0.87 08/14/2023     Lab Results   Component Value Date    IRON 68 08/17/2022    TIBC 341 08/17/2022    FERRITIN 132 08/17/2022     No results found for: "Lorraine Hernandez"  No results found for: "FOLATE"      Arterial Blood Gas result:  NA    Sleep studies:  Home sleep study on 12/27/2021 showed AHI 4.1, patient slept poorly during the study    Compliance Data:                                     Not applicable    MD [facility]

## 2023-10-18 ENCOUNTER — TELEPHONE (OUTPATIENT)
Age: 58
End: 2023-10-18

## 2023-10-18 ENCOUNTER — HOSPITAL ENCOUNTER (OUTPATIENT)
Dept: SLEEP CENTER | Facility: CLINIC | Age: 58
Discharge: HOME | End: 2023-10-18
Payer: COMMERCIAL

## 2023-10-18 DIAGNOSIS — G47.19 EXCESSIVE DAYTIME SLEEPINESS: ICD-10-CM

## 2023-10-18 PROCEDURE — 95810 POLYSOM 6/> YRS 4/> PARAM: CPT

## 2023-10-18 NOTE — TELEPHONE ENCOUNTER
Patient calling in over $100 for copay for rifaximin and asking if there are any samples in the office she can .

## 2023-10-19 PROBLEM — G47.33 OSA (OBSTRUCTIVE SLEEP APNEA): Status: ACTIVE | Noted: 2023-10-19

## 2023-10-19 NOTE — PROGRESS NOTES
Sleep Study Documentation    Pre-Sleep Study       Sleep testing procedure explained to patient:YES    Patient napped prior to study:NO    Caffeine:Dayshift worker after 12PM.  Caffeine use:NO    Alcohol:Dayshift workers after 5PM: Alcohol use:NO    Typical day for patient:YES       Study Documentation    Sleep Study Indications: snoring and excessive daytime sleepiness    Sleep Study: Diagnostic   Snore:soft to Moderate  Supplemental O2: no    Minimum SaO2 91  Baseline SaO2 96      EKG abnormalities: no     EEG abnormalities: no    Were abnormal behaviors in sleep observed:NO    Is Total Sleep Study Recording Time < 2 hours: N/A    Is Total Sleep Study Recording Time > 2 hours but study is incomplete: N/A    Is Total Sleep Study Recording Time 6 hours or more but sleep was not obtained: NO    Patient classification: employed       Post-Sleep Study    Medication used at bedtime or during sleep study:NO    Patient reports time it took to fall asleep:greater than 60 minutes    Patient reports waking up during study:3 or more times. Patient reports returning to sleep in 10 to 30 minutes. Patient reports sleeping 4 to 6 hours without dreaming. Does the Patient feel this is a typical night of sleep:typical    Patient rated sleepiness: Very sleepy or tired    PAP treatment:no.

## 2023-10-25 ENCOUNTER — TELEPHONE (OUTPATIENT)
Dept: SLEEP CENTER | Facility: CLINIC | Age: 58
End: 2023-10-25

## 2023-10-25 PROBLEM — G47.9 SLEEP DISORDER, UNSPECIFIED: Status: ACTIVE | Noted: 2023-10-25

## 2023-10-25 NOTE — TELEPHONE ENCOUNTER
Regarding: Lyrica  Contact: [phone]  ----- Message from Viktoriya Cowart RN sent at 10/24/2023 12:31 PM EDT -----       ----- Message from Bashir Lam to Orlando Goodwin MD sent at 10/20/2023 11:01 AM -----   Hi Dr Jame Etienne     Just following up to see if you had been in contact with Dr. Malathi Garcia my rheumatologist. Regarding the medicine duloxetine stopping that and starting Lyrica.     Thank you, Ivania Valentin

## 2023-10-27 ENCOUNTER — TELEPHONE (OUTPATIENT)
Dept: SLEEP CENTER | Facility: CLINIC | Age: 58
End: 2023-10-27

## 2023-10-27 NOTE — TELEPHONE ENCOUNTER
I called the patient and spoke to her. I left a message with her rheumatologist requesting a call back to discuss medication changes.

## 2023-10-30 PROBLEM — E87.6: Status: ACTIVE | Noted: 2023-01-09

## 2023-11-03 NOTE — PROGRESS NOTES
Spoke to Dr Juan Villarreal her rheumatologist and he is ok with switching to pregabalin. He requested I send him a copy of my note which will be faxed over. She will likely need to wean off duloxetine and switch to pregabalin.

## 2023-11-06 DIAGNOSIS — K21.9 GASTROESOPHAGEAL REFLUX DISEASE WITHOUT ESOPHAGITIS: Primary | ICD-10-CM

## 2023-11-06 RX ORDER — OMEPRAZOLE 40 MG/1
40 CAPSULE, DELAYED RELEASE ORAL DAILY
Qty: 90 CAPSULE | Refills: 1 | Status: SHIPPED | OUTPATIENT
Start: 2023-11-06

## 2023-11-15 ENCOUNTER — TELEPHONE (OUTPATIENT)
Dept: SLEEP CENTER | Facility: CLINIC | Age: 58
End: 2023-11-15

## 2023-11-15 NOTE — TELEPHONE ENCOUNTER
Called patient and let her know that I spoke with Dr Tamra Quiles and recommended a switch to Lyrica from Duloxetine. Dr Tamra Quiles was amenable to this change. I recommended she call his office for a appointment. I requested that she postpone her appointment with me until a month after she has switched to Lyrica. Discussed CBT-I with her. She would like to think about it and let me know.

## 2023-11-17 ENCOUNTER — TELEPHONE (OUTPATIENT)
Dept: NEUROLOGY | Facility: CLINIC | Age: 58
End: 2023-11-17

## 2023-11-17 NOTE — TELEPHONE ENCOUNTER
Called and spoke to patient. Patient confirmed upcoming apt with Arabella Mejía PA-C on 11/28/23 @ 8:15 am in the [facility].

## 2023-11-21 ENCOUNTER — PROCEDURE VISIT (OUTPATIENT)
Dept: NEUROLOGY | Facility: CLINIC | Age: 58
End: 2023-11-21
Payer: COMMERCIAL

## 2023-11-21 DIAGNOSIS — G62.9 NEUROPATHY: ICD-10-CM

## 2023-11-21 DIAGNOSIS — R20.2 PARESTHESIA: Primary | ICD-10-CM

## 2023-11-21 PROCEDURE — 95912 NRV CNDJ TEST 11-12 STUDIES: CPT | Performed by: PHYSICAL MEDICINE & REHABILITATION

## 2023-11-21 PROCEDURE — 95886 MUSC TEST DONE W/N TEST COMP: CPT | Performed by: PHYSICAL MEDICINE & REHABILITATION

## 2023-11-27 ENCOUNTER — APPOINTMENT (OUTPATIENT)
Dept: LAB | Facility: HOSPITAL | Age: 58
End: 2023-11-27
Payer: COMMERCIAL

## 2023-11-27 ENCOUNTER — OFFICE VISIT (OUTPATIENT)
Dept: NEUROLOGY | Facility: CLINIC | Age: 58
End: 2023-11-27
Payer: COMMERCIAL

## 2023-11-27 VITALS
BODY MASS INDEX: 23.07 KG/M2 | DIASTOLIC BLOOD PRESSURE: 70 MMHG | HEIGHT: 63 IN | SYSTOLIC BLOOD PRESSURE: 130 MMHG | HEART RATE: 84 BPM | WEIGHT: 130.2 LBS

## 2023-11-27 DIAGNOSIS — Z86.39 HISTORY OF LOW POTASSIUM: ICD-10-CM

## 2023-11-27 DIAGNOSIS — G47.19 EXCESSIVE DAYTIME SLEEPINESS: ICD-10-CM

## 2023-11-27 DIAGNOSIS — M35.00 SJOGREN'S SYNDROME WITHOUT EXTRAGLANDULAR INVOLVEMENT (HCC): ICD-10-CM

## 2023-11-27 DIAGNOSIS — G25.81 RESTLESS LEG SYNDROME: ICD-10-CM

## 2023-11-27 DIAGNOSIS — F51.04 CHRONIC INSOMNIA: Primary | ICD-10-CM

## 2023-11-27 DIAGNOSIS — G47.00 INSOMNIA, UNSPECIFIED TYPE: ICD-10-CM

## 2023-11-27 DIAGNOSIS — K21.00 GASTROESOPHAGEAL REFLUX DISEASE WITH ESOPHAGITIS, UNSPECIFIED WHETHER HEMORRHAGE: ICD-10-CM

## 2023-11-27 DIAGNOSIS — F43.22 ADJUSTMENT DISORDER WITH ANXIETY: ICD-10-CM

## 2023-11-27 DIAGNOSIS — R06.83 SNORING: ICD-10-CM

## 2023-11-27 LAB
25(OH)D3 SERPL-MCNC: 38.6 NG/ML (ref 30–100)
ALBUMIN SERPL BCP-MCNC: 4.6 G/DL (ref 3.5–5)
ALP SERPL-CCNC: 91 U/L (ref 34–104)
ALT SERPL W P-5'-P-CCNC: 15 U/L (ref 7–52)
ANION GAP SERPL CALCULATED.3IONS-SCNC: 8 MMOL/L
AST SERPL W P-5'-P-CCNC: 16 U/L (ref 13–39)
BASOPHILS # BLD AUTO: 0.09 THOUSANDS/ÂΜL (ref 0–0.1)
BASOPHILS NFR BLD AUTO: 1 % (ref 0–1)
BILIRUB SERPL-MCNC: 0.53 MG/DL (ref 0.2–1)
BUN SERPL-MCNC: 25 MG/DL (ref 5–25)
CALCIUM SERPL-MCNC: 9.7 MG/DL (ref 8.4–10.2)
CHLORIDE SERPL-SCNC: 105 MMOL/L (ref 96–108)
CO2 SERPL-SCNC: 30 MMOL/L (ref 21–32)
CREAT SERPL-MCNC: 0.79 MG/DL (ref 0.6–1.3)
EOSINOPHIL # BLD AUTO: 0.31 THOUSAND/ÂΜL (ref 0–0.61)
EOSINOPHIL NFR BLD AUTO: 4 % (ref 0–6)
ERYTHROCYTE [DISTWIDTH] IN BLOOD BY AUTOMATED COUNT: 12.5 % (ref 11.6–15.1)
GFR SERPL CREATININE-BSD FRML MDRD: 84 ML/MIN/1.73SQ M
GLUCOSE P FAST SERPL-MCNC: 102 MG/DL (ref 65–99)
HCT VFR BLD AUTO: 45.8 % (ref 34.8–46.1)
HGB BLD-MCNC: 16 G/DL (ref 11.5–15.4)
IMM GRANULOCYTES # BLD AUTO: 0.04 THOUSAND/UL (ref 0–0.2)
IMM GRANULOCYTES NFR BLD AUTO: 1 % (ref 0–2)
IRON SERPL-MCNC: 87 UG/DL (ref 50–212)
LYMPHOCYTES # BLD AUTO: 2.54 THOUSANDS/ÂΜL (ref 0.6–4.47)
LYMPHOCYTES NFR BLD AUTO: 31 % (ref 14–44)
MAGNESIUM SERPL-MCNC: 2.4 MG/DL (ref 1.9–2.7)
MCH RBC QN AUTO: 30.5 PG (ref 26.8–34.3)
MCHC RBC AUTO-ENTMCNC: 34.9 G/DL (ref 31.4–37.4)
MCV RBC AUTO: 87 FL (ref 82–98)
MONOCYTES # BLD AUTO: 0.7 THOUSAND/ÂΜL (ref 0.17–1.22)
MONOCYTES NFR BLD AUTO: 9 % (ref 4–12)
NEUTROPHILS # BLD AUTO: 4.47 THOUSANDS/ÂΜL (ref 1.85–7.62)
NEUTS SEG NFR BLD AUTO: 54 % (ref 43–75)
NRBC BLD AUTO-RTO: 0 /100 WBCS
PLATELET # BLD AUTO: 266 THOUSANDS/UL (ref 149–390)
PMV BLD AUTO: 9.8 FL (ref 8.9–12.7)
POTASSIUM SERPL-SCNC: 3.4 MMOL/L (ref 3.5–5.3)
PROT SERPL-MCNC: 7.1 G/DL (ref 6.4–8.4)
RBC # BLD AUTO: 5.24 MILLION/UL (ref 3.81–5.12)
SODIUM SERPL-SCNC: 143 MMOL/L (ref 135–147)
T4 FREE SERPL-MCNC: 0.67 NG/DL (ref 0.61–1.12)
TIBC SERPL-MCNC: <142 UG/DL (ref 250–450)
TSH SERPL DL<=0.05 MIU/L-ACNC: 3.62 UIU/ML (ref 0.45–4.5)
UIBC SERPL-MCNC: <55 UG/DL (ref 155–355)
WBC # BLD AUTO: 8.15 THOUSAND/UL (ref 4.31–10.16)

## 2023-11-27 PROCEDURE — 83550 IRON BINDING TEST: CPT

## 2023-11-27 PROCEDURE — 84439 ASSAY OF FREE THYROXINE: CPT

## 2023-11-27 PROCEDURE — 80053 COMPREHEN METABOLIC PANEL: CPT

## 2023-11-27 PROCEDURE — 99214 OFFICE O/P EST MOD 30 MIN: CPT | Performed by: INTERNAL MEDICINE

## 2023-11-27 PROCEDURE — 85025 COMPLETE CBC W/AUTO DIFF WBC: CPT

## 2023-11-27 PROCEDURE — 36415 COLL VENOUS BLD VENIPUNCTURE: CPT

## 2023-11-27 PROCEDURE — 82306 VITAMIN D 25 HYDROXY: CPT

## 2023-11-27 PROCEDURE — 83540 ASSAY OF IRON: CPT

## 2023-11-27 PROCEDURE — 83735 ASSAY OF MAGNESIUM: CPT

## 2023-11-27 PROCEDURE — 84443 ASSAY THYROID STIM HORMONE: CPT

## 2023-11-27 RX ORDER — RIFAXIMIN 550 MG/1
TABLET ORAL
COMMUNITY
Start: 2023-11-10

## 2023-11-27 RX ORDER — FAMOTIDINE 20 MG/1
20 TABLET, FILM COATED ORAL 2 TIMES DAILY
COMMUNITY
Start: 2023-10-23 | End: 2023-12-04 | Stop reason: SDUPTHER

## 2023-11-27 RX ORDER — PREGABALIN 50 MG/1
CAPSULE ORAL
COMMUNITY
Start: 2023-11-17

## 2023-11-27 NOTE — PROGRESS NOTES
Progress Note - Sleep Medicine  Jackson Mejía 56 y.o. female MRN: [id number]       Impression & Plan:       1. Restless leg syndrome  Ferritin on 9/13/2023 was 95  Spoke to rheumatologist Dr. Latesha Frye after last visit was weaned off of duloxetine  She was started on Lyrica 50 mg daily and requested to increase to 100 mg  She has not noticed any changes in restless leg symptoms currently    Plan  Continue on Lyrica  Monitor for improvement in restless leg symptoms after stopping duloxetine and starting Lyrica  Follow-up in 3 months    2. Chronic insomnia  She is sleeping 4 to 6 hours nightly  Primarily sleep maintenance insomnia  She is taking melatonin 5 mg at bedtime and is able to fall asleep within 30 to 60 minutes  I recommended she take melatonin earlier after she finishes her work at 9:30 PM (she works from home)    Plan  Provided referral for cognitive behavioral therapy for insomnia  If there is no improvement consider doxepin for nighttime awakenings  Follow-up in 3 months    3. Excessive daytime sleepiness  Raven score of 14  This is likely related to insufficient sleep  Although generally patients with insomnia do not have excessive daytime sleepiness  Sleep study did not show evidence  Diagnostic PSG 10/18/2023 at 129 pounds: AHI 2.1, PLM 0.0  However patient states that she slept poorly during sleep study    Plan  Could consider repeat sleep study due to poor sleep efficiency  ______________________________________________________________________    HPI:    Jackson Mejía 63-year-old female with a past medical history of neuropathy, asthma, allergic rhinitis, Sjogren's syndrome who presents for follow-up of restless leg syndrome and chronic insomnia. Since last office visit she has been weaned off of duloxetine and started on Lyrica as duloxetine may have been worsening her RLS. She has not noticed a change in her symptoms currently.   She continues to have excessive daytime sleepiness with an Raven score of 14. She generally goes to bed at 11 PM and falls asleep within 30 to 60 minutes. She wakes up at least 10 times at night. She is getting 4 to 6 hours of sleep nightly. Sleep study did not meet criteria for obstructive sleep apnea but patient slept poorly during the study. She has been taking melatonin 5 mg at 10:30 PM.  She generally works from home until 9:30 PM.            Review of Systems:  Review of Systems   All other systems reviewed and are negative.         Social history updates:  Social History     Tobacco Use   Smoking Status Never   Smokeless Tobacco Never     Social History     Socioeconomic History    Marital status: Single     Spouse name: Not on file    Number of children: Not on file    Years of education: Not on file    Highest education level: Not on file   Occupational History    Not on file   Tobacco Use    Smoking status: Never    Smokeless tobacco: Never   Vaping Use    Vaping Use: Never used   Substance and Sexual Activity    Alcohol use: Never    Drug use: Never    Sexual activity: Not on file   Other Topics Concern    Not on file   Social History Narrative    Not on file     Social Determinants of Health     Financial Resource Strain: Not on file   Food Insecurity: Not on file   Transportation Needs: Not on file   Physical Activity: Not on file   Stress: Not on file   Social Connections: Not on file   Intimate Partner Violence: Not on file   Housing Stability: Not on file       PhysicalExamination:  Vitals:   /70 (BP Location: Left arm, Patient Position: Sitting, Cuff Size: Standard)   Pulse 84   Ht 5' 3" (1.6 m)   Wt 59.1 kg (130 lb 3.2 oz)   BMI 23.06 kg/m²     Physical Exam  General:  Awake alert and oriented x 3, conversant without conversational dyspnea, NAD, normal affect  HEENT:  PERRL, Sclera noninjected, nonicteric OU, Nares patent,  no craniofacial abnormalities, Mucous membranes, moist, no oral lesions, normal dentition  NECK:  Trachea midline, no accessory muscle use, no stridor, no cervical or supraclavicular adenopathy, JVP not elevated  CARDIAC: Reg, single s1/S2, no m/r/g  PULM: CTA bilaterally no wheezing, rhonchi or rales  EXT: No cyanosis, no clubbing, no edema, normal capillary refill  NEURO: no focal neurologic deficits, AAOx3, moving all extremities appropriately     Diagnostic Data:  Labs:   I personally reviewed the most recent laboratory data pertinent to today's visit  Appointment on 11/27/2023   Component Date Value    WBC 11/27/2023 8.15     RBC 11/27/2023 5.24 (H)     Hemoglobin 11/27/2023 16.0 (H)     Hematocrit 11/27/2023 45.8     MCV 11/27/2023 87     MCH 11/27/2023 30.5     MCHC 11/27/2023 34.9     RDW 11/27/2023 12.5     MPV 11/27/2023 9.8     Platelets 87/13/4421 266     nRBC 11/27/2023 0     Neutrophils Relative 11/27/2023 54     Immat GRANS % 11/27/2023 1     Lymphocytes Relative 11/27/2023 31     Monocytes Relative 11/27/2023 9     Eosinophils Relative 11/27/2023 4     Basophils Relative 11/27/2023 1     Neutrophils Absolute 11/27/2023 4.47     Immature Grans Absolute 11/27/2023 0.04     Lymphocytes Absolute 11/27/2023 2.54     Monocytes Absolute 11/27/2023 0.70     Eosinophils Absolute 11/27/2023 0.31     Basophils Absolute 11/27/2023 0.09     Sodium 11/27/2023 143     Potassium 11/27/2023 3.4 (L)     Chloride 11/27/2023 105     CO2 11/27/2023 30     ANION GAP 11/27/2023 8     BUN 11/27/2023 25     Creatinine 11/27/2023 0.79     Glucose, Fasting 11/27/2023 102 (H)     Calcium 11/27/2023 9.7     AST 11/27/2023 16     ALT 11/27/2023 15     Alkaline Phosphatase 11/27/2023 91     Total Protein 11/27/2023 7.1     Albumin 11/27/2023 4.6     Total Bilirubin 11/27/2023 0.53     eGFR 11/27/2023 84     Magnesium 11/27/2023 2.4     TSH 3RD GENERATON 11/27/2023 3.620    Appointment on 08/14/2023   Component Date Value    Sodium 08/14/2023 142     Potassium 08/14/2023 3.7     Chloride 08/14/2023 101     CO2 08/14/2023 33 (H)     ANION GAP 08/14/2023 8     BUN 08/14/2023 15     Creatinine 08/14/2023 0.87     Glucose, Fasting 08/14/2023 81     Calcium 08/14/2023 10.0     AST 08/14/2023 17     ALT 08/14/2023 14     Alkaline Phosphatase 08/14/2023 81     Total Protein 08/14/2023 7.1     Albumin 08/14/2023 4.6     Total Bilirubin 08/14/2023 0.87     eGFR 08/14/2023 75     WBC 08/14/2023 6.77     RBC 08/14/2023 5.38 (H)     Hemoglobin 08/14/2023 16.0 (H)     Hematocrit 08/14/2023 47.0 (H)     MCV 08/14/2023 87     MCH 08/14/2023 29.7     MCHC 08/14/2023 34.0     RDW 08/14/2023 12.1     MPV 08/14/2023 10.4     Platelets 67/60/0375 264     nRBC 08/14/2023 0     Neutrophils Relative 08/14/2023 50     Immat GRANS % 08/14/2023 0     Lymphocytes Relative 08/14/2023 34     Monocytes Relative 08/14/2023 10     Eosinophils Relative 08/14/2023 4     Basophils Relative 08/14/2023 2 (H)     Neutrophils Absolute 08/14/2023 3.39     Immature Grans Absolute 08/14/2023 0.02     Lymphocytes Absolute 08/14/2023 2.32     Monocytes Absolute 08/14/2023 0.66     Eosinophils Absolute 08/14/2023 0.28     Basophils Absolute 08/14/2023 0.10     Vit D, 25-Hydroxy 08/14/2023 41.1     C3 Complement 08/14/2023 119.0     C4, COMPLEMENT 08/14/2023 27.0     Color, UA 08/14/2023 Light Yellow     Clarity, UA 08/14/2023 Turbid     Specific Gravity, UA 08/14/2023 1.013     pH, UA 08/14/2023 7.0     Leukocytes, UA 08/14/2023 Negative     Nitrite, UA 08/14/2023 Negative     Protein, UA 08/14/2023 Negative     Glucose, UA 08/14/2023 Negative     Ketones, UA 08/14/2023 Negative     Urobilinogen, UA 08/14/2023 <2.0     Bilirubin, UA 08/14/2023 Negative     Occult Blood, UA 08/14/2023 Negative        I have personally reviewed pertinent lab results.   Lab Results   Component Value Date    WBC 8.15 11/27/2023    HGB 16.0 (H) 11/27/2023    HCT 45.8 11/27/2023    MCV 87 11/27/2023     11/27/2023     Lab Results   Component Value Date    CALCIUM 9.7 11/27/2023    K 3.4 (L) 11/27/2023    CO2 30 11/27/2023     11/27/2023    BUN 25 11/27/2023    CREATININE 0.79 11/27/2023     Lab Results   Component Value Date    IGE 9.37 01/07/2019     Lab Results   Component Value Date    ALT 15 11/27/2023    AST 16 11/27/2023    ALKPHOS 91 11/27/2023     Lab Results   Component Value Date    IRON 68 08/17/2022    TIBC 341 08/17/2022    FERRITIN 132 08/17/2022     No results found for: "Shannon Siemens"  No results found for: "FOLATE"      Arterial Blood Gas result: NA    Sleep studies:  Diagnostic PSG 10/18/2023 at 129 pounds: AHI 2.1, PLM 0.0    Compliance Data:  NA                                         Yvonne Grijalva MD  [facility]

## 2023-11-27 NOTE — PATIENT INSTRUCTIONS
Ordered referral for Cbt for insomnia  Take melatonin 5mg at 930pm  Follow up in 3 months  We will discuss doxepin at next visit if there is no improvement

## 2023-11-28 ENCOUNTER — OFFICE VISIT (OUTPATIENT)
Dept: NEUROLOGY | Facility: CLINIC | Age: 58
End: 2023-11-28
Payer: COMMERCIAL

## 2023-11-28 VITALS
BODY MASS INDEX: 22.96 KG/M2 | WEIGHT: 129.6 LBS | SYSTOLIC BLOOD PRESSURE: 140 MMHG | HEART RATE: 99 BPM | HEIGHT: 63 IN | DIASTOLIC BLOOD PRESSURE: 96 MMHG | TEMPERATURE: 97.8 F | OXYGEN SATURATION: 98 %

## 2023-11-28 DIAGNOSIS — R93.0 ABNORMAL MRI OF HEAD: Primary | ICD-10-CM

## 2023-11-28 DIAGNOSIS — M54.16 RADICULOPATHY, LUMBAR REGION: ICD-10-CM

## 2023-11-28 DIAGNOSIS — G62.9 NEUROPATHY: ICD-10-CM

## 2023-11-28 DIAGNOSIS — M35.00 SJOGREN'S SYNDROME WITHOUT EXTRAGLANDULAR INVOLVEMENT (HCC): ICD-10-CM

## 2023-11-28 DIAGNOSIS — G25.81 RESTLESS LEG SYNDROME: ICD-10-CM

## 2023-11-28 PROCEDURE — 99214 OFFICE O/P EST MOD 30 MIN: CPT | Performed by: PHYSICIAN ASSISTANT

## 2023-11-28 NOTE — ASSESSMENT & PLAN NOTE
Patient was referred to sleep medicine for RLS symptoms, frequent nighttime awakening, daytime fatigue. She had a sleep study which did not meet criteria for LENY, but she had poor sleep efficiency. Sleep med referred her to psychiatry for CBT for insomnia and discussed considering a repeat sleep study. She was also advised to transition off duloxetine which can worsen RLS symptoms, and is now on pregabalin instead of duloxetine (through her rheumatologist). She should continue to follow with sleep medicine.

## 2023-11-28 NOTE — ASSESSMENT & PLAN NOTE
Patient has been followed for over 10 years for white matter changes in the brain. Cord imaging has been negative to date. Imaging has been stable over time. No LP. She has been kept under clinical and radiographic surveillance, no formal diagnosis of MS. MRI brain recently updated 10/12/23. This was grossly stable compared to 5/26/21. Possible slight increase of signal abnormality in the anterior left external capsule, otherwise no new findings and no involvement of callosal septal interface. Discussed brain MRI stable. Could consider repeating in a few years or if any new symptoms to warrant sooner imaging. Neurologic exam remains stable.
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
## 2023-11-28 NOTE — ASSESSMENT & PLAN NOTE
EMG of the LEs demonstrated a chronic left L5 radiculopathy. Has history of LBP, believes she had prior MRI lumbar. She is not having any radicular symptoms at this time, therefore no reason to update imaging or send to pain management. If any increased LBP with radiation into the left leg, would refer to PT and pain management.

## 2023-11-28 NOTE — ASSESSMENT & PLAN NOTE
Prior EMGs in 2015 of both UE and LEs did not demonstrate large fiber neuropathy. She recently reported increased neuropathic pain in the feet. EMG lona LE updated 11/21/23 was also negative for large fiber neuropathy. She likely has small fiber neuropathy related to her rheumatologic disorder. Treatment will be symptomatic. She has previously been on gabapentin, which caused side effects, and more recently duloxetine through rheumatology. She saw sleep medicine for RLS and they suggested coming off duloxetine and starting pregabalin. Her rheumatologist just switched her from duloxetine to pregabalin about 5 days ago. Hopefully this will assist with neuropathic pain in the feet. TCAs should be avoided due to her dry mouth already from Sjogren's. B12 noted to be low recently at 334. Suggested starting OTC B12 1000mcg daily.

## 2023-11-28 NOTE — PATIENT INSTRUCTIONS
Continue with switch over from duloxetine to Lyrica  Continue to follow with sleep medicine  Follow up in 4 months or sooner if needed

## 2023-12-04 DIAGNOSIS — K21.9 GASTROESOPHAGEAL REFLUX DISEASE WITHOUT ESOPHAGITIS: Primary | ICD-10-CM

## 2023-12-04 RX ORDER — FAMOTIDINE 20 MG/1
20 TABLET, FILM COATED ORAL DAILY
Qty: 90 TABLET | Refills: 2 | Status: SHIPPED | OUTPATIENT
Start: 2023-12-04

## 2023-12-16 ENCOUNTER — APPOINTMENT (OUTPATIENT)
Dept: LAB | Facility: HOSPITAL | Age: 58
End: 2023-12-16
Payer: COMMERCIAL

## 2023-12-16 DIAGNOSIS — M25.572 LEFT ANKLE PAIN, UNSPECIFIED CHRONICITY: ICD-10-CM

## 2023-12-16 DIAGNOSIS — Z79.899 ENCOUNTER FOR LONG-TERM (CURRENT) USE OF OTHER MEDICATIONS: ICD-10-CM

## 2023-12-16 DIAGNOSIS — M35.00 SICCA SYNDROME (HCC): ICD-10-CM

## 2023-12-16 LAB
ALBUMIN SERPL BCP-MCNC: 4 G/DL (ref 3.5–5)
ALP SERPL-CCNC: 99 U/L (ref 34–104)
ALT SERPL W P-5'-P-CCNC: 19 U/L (ref 7–52)
ANION GAP SERPL CALCULATED.3IONS-SCNC: 5 MMOL/L
AST SERPL W P-5'-P-CCNC: 18 U/L (ref 13–39)
BILIRUB SERPL-MCNC: 0.45 MG/DL (ref 0.2–1)
BUN SERPL-MCNC: 15 MG/DL (ref 5–25)
CALCIUM SERPL-MCNC: 9.3 MG/DL (ref 8.4–10.2)
CHLORIDE SERPL-SCNC: 104 MMOL/L (ref 96–108)
CO2 SERPL-SCNC: 32 MMOL/L (ref 21–32)
CREAT SERPL-MCNC: 0.73 MG/DL (ref 0.6–1.3)
ERYTHROCYTE [DISTWIDTH] IN BLOOD BY AUTOMATED COUNT: 12.7 % (ref 11.6–15.1)
ERYTHROCYTE [SEDIMENTATION RATE] IN BLOOD: 5 MM/HOUR (ref 0–29)
GFR SERPL CREATININE-BSD FRML MDRD: 93 ML/MIN/1.73SQ M
GLUCOSE P FAST SERPL-MCNC: 87 MG/DL (ref 65–99)
HCT VFR BLD AUTO: 44.3 % (ref 34.8–46.1)
HGB BLD-MCNC: 15.5 G/DL (ref 11.5–15.4)
MCH RBC QN AUTO: 30.8 PG (ref 26.8–34.3)
MCHC RBC AUTO-ENTMCNC: 35 G/DL (ref 31.4–37.4)
MCV RBC AUTO: 88 FL (ref 82–98)
PLATELET # BLD AUTO: 267 THOUSANDS/UL (ref 149–390)
PMV BLD AUTO: 10.2 FL (ref 8.9–12.7)
POTASSIUM SERPL-SCNC: 3.6 MMOL/L (ref 3.5–5.3)
PROT SERPL-MCNC: 6.3 G/DL (ref 6.4–8.4)
RBC # BLD AUTO: 5.04 MILLION/UL (ref 3.81–5.12)
SODIUM SERPL-SCNC: 141 MMOL/L (ref 135–147)
WBC # BLD AUTO: 7.95 THOUSAND/UL (ref 4.31–10.16)

## 2023-12-16 PROCEDURE — 36415 COLL VENOUS BLD VENIPUNCTURE: CPT

## 2023-12-16 PROCEDURE — 80053 COMPREHEN METABOLIC PANEL: CPT

## 2023-12-16 PROCEDURE — 86160 COMPLEMENT ANTIGEN: CPT

## 2023-12-16 PROCEDURE — 85027 COMPLETE CBC AUTOMATED: CPT

## 2023-12-16 PROCEDURE — 85652 RBC SED RATE AUTOMATED: CPT

## 2023-12-17 LAB
C3 SERPL-MCNC: 128 MG/DL (ref 87–200)
C4 SERPL-MCNC: 34 MG/DL (ref 19–52)

## 2023-12-26 ENCOUNTER — TELEPHONE (OUTPATIENT)
Dept: BEHAVIORAL/MENTAL HEALTH CLINIC | Facility: CLINIC | Age: 58
End: 2023-12-26

## 2023-12-26 NOTE — TELEPHONE ENCOUNTER
Writer spoke to patient about CBT-I sleep therapy.  Per patient not looking for Group wants 1:1 possible virtual.  Does not want to be on wait list any longer unless it is for 1:1 therapy

## 2024-01-19 DIAGNOSIS — K21.9 GASTROESOPHAGEAL REFLUX DISEASE WITHOUT ESOPHAGITIS: Primary | ICD-10-CM

## 2024-01-19 RX ORDER — FAMOTIDINE 40 MG/1
40 TABLET, FILM COATED ORAL 2 TIMES DAILY
Qty: 180 TABLET | Refills: 0 | Status: SHIPPED | OUTPATIENT
Start: 2024-01-19 | End: 2024-04-18

## 2024-01-24 ENCOUNTER — APPOINTMENT (OUTPATIENT)
Dept: LAB | Facility: HOSPITAL | Age: 59
End: 2024-01-24
Payer: COMMERCIAL

## 2024-01-24 DIAGNOSIS — Z79.899 ENCOUNTER FOR LONG-TERM (CURRENT) USE OF OTHER MEDICATIONS: ICD-10-CM

## 2024-01-24 DIAGNOSIS — M35.00 SICCA SYNDROME (HCC): ICD-10-CM

## 2024-01-24 LAB
ALBUMIN SERPL BCP-MCNC: 4.4 G/DL (ref 3.5–5)
ALP SERPL-CCNC: 77 U/L (ref 34–104)
ALT SERPL W P-5'-P-CCNC: 25 U/L (ref 7–52)
ANION GAP SERPL CALCULATED.3IONS-SCNC: 7 MMOL/L
AST SERPL W P-5'-P-CCNC: 24 U/L (ref 13–39)
BILIRUB SERPL-MCNC: 0.9 MG/DL (ref 0.2–1)
BUN SERPL-MCNC: 18 MG/DL (ref 5–25)
C3 SERPL-MCNC: 130 MG/DL (ref 87–200)
C4 SERPL-MCNC: 30 MG/DL (ref 19–52)
CALCIUM SERPL-MCNC: 9.3 MG/DL (ref 8.4–10.2)
CHLORIDE SERPL-SCNC: 105 MMOL/L (ref 96–108)
CO2 SERPL-SCNC: 30 MMOL/L (ref 21–32)
CREAT SERPL-MCNC: 0.7 MG/DL (ref 0.6–1.3)
ERYTHROCYTE [DISTWIDTH] IN BLOOD BY AUTOMATED COUNT: 12.5 % (ref 11.6–15.1)
ERYTHROCYTE [SEDIMENTATION RATE] IN BLOOD: 5 MM/HOUR (ref 0–29)
GFR SERPL CREATININE-BSD FRML MDRD: 97 ML/MIN/1.73SQ M
GLUCOSE P FAST SERPL-MCNC: 92 MG/DL (ref 65–99)
HCT VFR BLD AUTO: 44.9 % (ref 34.8–46.1)
HGB BLD-MCNC: 15.4 G/DL (ref 11.5–15.4)
MCH RBC QN AUTO: 30 PG (ref 26.8–34.3)
MCHC RBC AUTO-ENTMCNC: 34.3 G/DL (ref 31.4–37.4)
MCV RBC AUTO: 87 FL (ref 82–98)
PLATELET # BLD AUTO: 237 THOUSANDS/UL (ref 149–390)
PMV BLD AUTO: 10.6 FL (ref 8.9–12.7)
POTASSIUM SERPL-SCNC: 3.5 MMOL/L (ref 3.5–5.3)
PROT SERPL-MCNC: 6.8 G/DL (ref 6.4–8.4)
RBC # BLD AUTO: 5.14 MILLION/UL (ref 3.81–5.12)
SODIUM SERPL-SCNC: 142 MMOL/L (ref 135–147)
WBC # BLD AUTO: 6.21 THOUSAND/UL (ref 4.31–10.16)

## 2024-01-24 PROCEDURE — 80053 COMPREHEN METABOLIC PANEL: CPT

## 2024-01-24 PROCEDURE — 85652 RBC SED RATE AUTOMATED: CPT

## 2024-01-24 PROCEDURE — 36415 COLL VENOUS BLD VENIPUNCTURE: CPT

## 2024-01-24 PROCEDURE — 85027 COMPLETE CBC AUTOMATED: CPT

## 2024-01-24 PROCEDURE — 86160 COMPLEMENT ANTIGEN: CPT

## 2024-02-09 ENCOUNTER — TELEPHONE (OUTPATIENT)
Dept: NEUROLOGY | Facility: CLINIC | Age: 59
End: 2024-02-09

## 2024-02-09 NOTE — TELEPHONE ENCOUNTER
Patient had an appointment scheduled for march 4 that she canceled . I called patient to see if she would like to reschedule no answer left vm to give us a call back .

## 2024-04-11 DIAGNOSIS — K21.9 GASTROESOPHAGEAL REFLUX DISEASE WITHOUT ESOPHAGITIS: ICD-10-CM

## 2024-04-12 RX ORDER — OMEPRAZOLE 40 MG/1
40 CAPSULE, DELAYED RELEASE ORAL DAILY
Qty: 90 CAPSULE | Refills: 0 | Status: SHIPPED | OUTPATIENT
Start: 2024-04-12

## 2024-04-12 RX ORDER — FAMOTIDINE 40 MG/1
40 TABLET, FILM COATED ORAL 2 TIMES DAILY
Qty: 180 TABLET | Refills: 0 | Status: SHIPPED | OUTPATIENT
Start: 2024-04-12 | End: 2024-07-11

## 2024-04-16 ENCOUNTER — TELEPHONE (OUTPATIENT)
Dept: PSYCHIATRY | Facility: CLINIC | Age: 59
End: 2024-04-16

## 2024-04-16 NOTE — TELEPHONE ENCOUNTER
Contacted patient off of Talk Therapy wait list in attempts to offer patient an appointment at [facility]. Pt stated that at this time she is not interested due to not having the funds.     Pt was advised to call intake back at [phone] when she is ready for services.

## 2024-05-02 ENCOUNTER — HOSPITAL ENCOUNTER (OUTPATIENT)
Dept: RADIOLOGY | Facility: HOSPITAL | Age: 59
Discharge: HOME | End: 2024-05-02
Payer: COMMERCIAL

## 2024-05-02 DIAGNOSIS — M54.16 LUMBAR RADICULOPATHY: ICD-10-CM

## 2024-05-02 PROCEDURE — 72100 X-RAY EXAM L-S SPINE 2/3 VWS: CPT

## 2024-05-28 ENCOUNTER — OFFICE VISIT (OUTPATIENT)
Dept: NEUROLOGY | Facility: CLINIC | Age: 59
End: 2024-05-28
Payer: COMMERCIAL

## 2024-05-28 VITALS
HEART RATE: 82 BPM | BODY MASS INDEX: 25.34 KG/M2 | WEIGHT: 143 LBS | DIASTOLIC BLOOD PRESSURE: 66 MMHG | SYSTOLIC BLOOD PRESSURE: 105 MMHG | HEIGHT: 63 IN

## 2024-05-28 DIAGNOSIS — R93.0 ABNORMAL MRI OF HEAD: Primary | ICD-10-CM

## 2024-05-28 DIAGNOSIS — M35.00 SJOGREN'S SYNDROME WITHOUT EXTRAGLANDULAR INVOLVEMENT (HCC): ICD-10-CM

## 2024-05-28 DIAGNOSIS — G25.81 RESTLESS LEG SYNDROME: ICD-10-CM

## 2024-05-28 DIAGNOSIS — M54.16 RADICULOPATHY, LUMBAR REGION: ICD-10-CM

## 2024-05-28 DIAGNOSIS — G62.9 NEUROPATHY: ICD-10-CM

## 2024-05-28 PROBLEM — G47.33 OSA (OBSTRUCTIVE SLEEP APNEA): Status: RESOLVED | Noted: 2023-10-19 | Resolved: 2024-05-28

## 2024-05-28 PROCEDURE — 99214 OFFICE O/P EST MOD 30 MIN: CPT | Performed by: PHYSICIAN ASSISTANT

## 2024-05-28 RX ORDER — BACLOFEN 10 MG/1
10 TABLET ORAL 2 TIMES DAILY
COMMUNITY
Start: 2024-05-02

## 2024-05-28 RX ORDER — AMLODIPINE BESYLATE 5 MG/1
5 TABLET ORAL DAILY
COMMUNITY

## 2024-05-28 NOTE — ASSESSMENT & PLAN NOTE
Patient has been followed for over 10 years for non-specific white matter changes in the brain.  Cord imaging has been negative to date.  Imaging has been stable over time. No LP. She has been kept under clinical and radiographic surveillance, no formal diagnosis of MS.        MRI brain updated 10/12/23. This was grossly stable compared to 5/26/21. Possible slight increase of signal abnormality in the anterior left external capsule, otherwise no new findings and no involvement of callosal septal interface.      Discussed brain MRI stable. Could consider repeating in a few years or if any new symptoms to warrant sooner imaging. Neurologic exam remains stable.

## 2024-05-28 NOTE — PATIENT INSTRUCTIONS
Would ask your rheumatologist if Lyrica could be increased   Agree with PT for your back once you get new insurance.  If no improvement, would suggest pain management referral  Continue B12 supplement   Follow up in 6-8 months

## 2024-05-28 NOTE — ASSESSMENT & PLAN NOTE
Prior EMGs in 2015 of both UE and LEs did not demonstrate large fiber neuropathy. EMG lona LE updated 11/21/23 was also negative for large fiber neuropathy.     She likely has small fiber neuropathy related to her rheumatologic disorder. Treatment will be symptomatic.      She has previously been on gabapentin, which caused side effects, and more recently duloxetine through rheumatology. She saw sleep medicine for RLS and they suggested coming off duloxetine and starting pregabalin. Her rheumatologist just switched her from duloxetine to pregabalin.  She is still having some neuropathic pain in the feet.  Suggested she discuss with rheumatologist about increasing the dose. TCAs should be avoided due to her dry mouth already from Sjogren's.    Her B12 level is also slightly low and she is now on a B12 supplement.

## 2024-05-28 NOTE — ASSESSMENT & PLAN NOTE
EMG of the LEs demonstrated a chronic left L5 radiculopathy. Has history of LBP, recently had xray through rheumatology and prescribed baclofen and PT.  Has not completed PT due to recently losing job and will lose insurance at the end of this week.  She has applied to Medicaid.  Advised once she gets new insurance, would agree with PT.  If no improvement, should be seen by spine and pain.
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
## 2024-05-28 NOTE — ASSESSMENT & PLAN NOTE
Patient was referred to sleep medicine for RLS symptoms, frequent nighttime awakening, daytime fatigue. She had a sleep study which did not meet criteria for LENY, but she had poor sleep efficiency. Sleep med referred her to psychiatry for CBT for insomnia and discussed considering a repeat sleep study. She was also advised to transition off duloxetine which can worsen RLS symptoms, and is now on pregabalin instead of duloxetine (through her rheumatologist).     Should continue to follow with sleep medicine

## 2024-07-04 DIAGNOSIS — K21.9 GASTROESOPHAGEAL REFLUX DISEASE WITHOUT ESOPHAGITIS: ICD-10-CM

## 2024-07-05 RX ORDER — OMEPRAZOLE 40 MG/1
40 CAPSULE, DELAYED RELEASE ORAL DAILY
Qty: 100 CAPSULE | Refills: 1 | Status: SHIPPED | OUTPATIENT
Start: 2024-07-05

## 2024-07-05 RX ORDER — FAMOTIDINE 40 MG/1
40 TABLET, FILM COATED ORAL 2 TIMES DAILY
Qty: 200 TABLET | Refills: 1 | Status: SHIPPED | OUTPATIENT
Start: 2024-07-05

## 2024-07-23 ENCOUNTER — OFFICE VISIT (OUTPATIENT)
Age: 59
End: 2024-07-23
Payer: COMMERCIAL

## 2024-07-23 ENCOUNTER — TELEPHONE (OUTPATIENT)
Age: 59
End: 2024-07-23

## 2024-07-23 VITALS
HEIGHT: 63 IN | DIASTOLIC BLOOD PRESSURE: 84 MMHG | WEIGHT: 144 LBS | HEART RATE: 84 BPM | OXYGEN SATURATION: 97 % | BODY MASS INDEX: 25.52 KG/M2 | SYSTOLIC BLOOD PRESSURE: 120 MMHG

## 2024-07-23 DIAGNOSIS — K58.9 IRRITABLE BOWEL SYNDROME, UNSPECIFIED TYPE: Primary | ICD-10-CM

## 2024-07-23 PROCEDURE — 99214 OFFICE O/P EST MOD 30 MIN: CPT | Performed by: PHYSICIAN ASSISTANT

## 2024-07-23 RX ORDER — KETOROLAC TROMETHAMINE 5 MG/ML
SOLUTION OPHTHALMIC
COMMUNITY

## 2024-07-23 RX ORDER — MELOXICAM 15 MG/1
TABLET ORAL
COMMUNITY
Start: 2024-07-22

## 2024-07-23 RX ORDER — PREDNISONE 20 MG/1
TABLET ORAL
COMMUNITY
Start: 2024-05-02

## 2024-07-23 RX ORDER — MIRTAZAPINE 15 MG/1
TABLET, FILM COATED ORAL
COMMUNITY
Start: 2024-04-16

## 2024-07-23 RX ORDER — PREGABALIN 100 MG/1
100 CAPSULE ORAL 2 TIMES DAILY
COMMUNITY
Start: 2024-05-30

## 2024-07-23 RX ORDER — PILOCARPINE HYDROCHLORIDE 5 MG/1
TABLET, FILM COATED ORAL
COMMUNITY
Start: 2024-03-04

## 2024-07-23 RX ORDER — PREDNISOLONE ACETATE 10 MG/ML
SUSPENSION/ DROPS OPHTHALMIC
COMMUNITY

## 2024-07-23 RX ORDER — OFLOXACIN 3 MG/ML
SOLUTION/ DROPS OPHTHALMIC
COMMUNITY

## 2024-07-24 NOTE — PROGRESS NOTES
[facility] - Outpatient Follow-up Note  Nel Guido 58 y.o. female MRN: [id number]  Encounter: [id number]          ASSESSMENT AND PLAN:      1. Irritable bowel syndrome    Patient presents for follow up of her IBS.  She has a history of IBS and SIBO and previously followed with a GI in [place].  Per review of records she had a colonoscopy with visualization of the terminal ileum in May of 2021 which showed a 6mm adenoma that was removed and hemorrhoids (repeat colonoscopy recommended in 6 years - 5/2026).  She also had an EGD in May of 2021 which was normal and biopsies were negative for h pylori and celiac.  She also previously had negative celiac serology.  She was previously treated for SIBO with doxycycline per records in 2021.  She reports a return of issues with her IBS with bloating and BM irregularities.  She is on a modified low FODMAP diet. She is gluten-free and lactose-free. She cannot take Bentyl due to her Sjogren's. She previously responded very well to a Xifaxan course in 2023.  She reports Metamucil worsened her pain.    Will give a Xifaxan 550mg po TID x 14 days course for her IBS.  Continue the low FODMAP diet.  ______________________________________________________________________    SUBJECTIVE:  Patient is a pleasant 58 year old female who presents to the office for follow up of her IBS.  She reports a return of more issues with her IBS recently.  She does report increased stress as she was laid off from her job recently causing financial stress and she also reports a weight gain.  She reports bloating and BM irregularities.  She reports Metamucil worsened her pain.  She cannot take Bentyl due to her Sjogren's.  She reports very well to a Xifaxan course last year when she took it.  She is on a low FODMAP diet. No bloody stools.      REVIEW OF SYSTEMS IS OTHERWISE NEGATIVE.      Historical Information   Past Medical History:   Diagnosis Date    GERD  (gastroesophageal reflux disease)     Irritable bowel syndrome with constipation     Sjogren's disease (HCC) 02/2022     Past Surgical History:   Procedure Laterality Date    APPENDECTOMY  1997    KNEE SURGERY Right 2000     Social History   Social History     Substance and Sexual Activity   Alcohol Use Never     Social History     Substance and Sexual Activity   Drug Use Never     Social History     Tobacco Use   Smoking Status Never   Smokeless Tobacco Never     Family History   Adopted: Yes   Family history unknown: Yes       Meds/Allergies       Current Outpatient Medications:     albuterol (PROVENTIL HFA,VENTOLIN HFA) 90 mcg/act inhaler    amLODIPine (NORVASC) 5 mg tablet    Azelastine HCl 0.15 % SOLN    baclofen 10 mg tablet    Calcium Carbonate 1500 (600 Ca) MG TABS    Cetirizine HCl (ZyrTEC Allergy) 10 MG CAPS    chloroquine (ARALEN) 250 MG tablet    Cyanocobalamin (VITAMIN B12 PO)    cycloSPORINE (RESTASIS) 0.05 % ophthalmic emulsion    Diclofenac Sodium (VOLTAREN) 1 %    ergocalciferol (VITAMIN D2) 50,000 units    Evening Primrose Oil 500 MG CAPS    famotidine (PEPCID) 40 MG tablet    Klor-Con M10 10 MEQ tablet    loteprednol etabonate (LOTEMAX) 0.5 % ophthalmic suspension    meloxicam (MOBIC) 15 mg tablet    montelukast (SINGULAIR) 10 mg tablet    omeprazole (PriLOSEC) 40 MG capsule    pilocarpine (SALAGEN) 5 mg tablet    polyethylene glycol (GLYCOLAX) 17 GM/SCOOP powder    pregabalin (LYRICA) 100 mg capsule    rifaximin (XIFAXAN) 550 mg tablet    vitamin E 100 UNIT capsule    famotidine (PEPCID) 20 mg tablet    Fluticasone Furoate-Vilanterol (Breo Ellipta) 100-25 mcg/actuation inhaler    ketorolac (ACULAR) 0.5 % ophthalmic solution    meloxicam (MOBIC) 7.5 mg tablet    mirtazapine (REMERON) 15 mg tablet    ofloxacin (OCUFLOX) 0.3 % ophthalmic solution    prednisoLONE acetate (PRED FORTE) 1 % ophthalmic suspension    predniSONE 20 mg tablet    pregabalin (LYRICA) 50 mg capsule    rifaximin (XIFAXAN) 200  "mg tablet    Allergies   Allergen Reactions    Trazodone Other (See Comments)    Clindamycin Hives and Other (See Comments)    Naproxen Hives and Other (See Comments)    Penicillins Hives, Itching, Other (See Comments), Rash and Edema    Gabapentin Other (See Comments)    Latex Itching    Rofecoxib Other (See Comments)    Sulindac Other (See Comments)    Tramadol Syncope    Bacitracin Rash    Gadolinium Derivatives Itching and Rash     Action Taken: Medrol & Benadryl Prep Prior to MRIs;     Gluten Meal - Food Allergy Rash    Lamotrigine Other (See Comments) and Rash    Medical Tape Rash    Oxcarbazepine Hives, Itching, Other (See Comments) and Rash    Shellfish-Derived Products - Food Allergy Hives    Sulfamethoxazole-Trimethoprim Hives, Rash, Other (See Comments) and Edema          Tiagabine Other (See Comments) and Rash    Zonisamide Other (See Comments) and Rash           Objective     Blood pressure 120/84, pulse 84, height 5' 3\" (1.6 m), weight 65.3 kg (144 lb), SpO2 97%. Body mass index is 25.51 kg/m².      PHYSICAL EXAM:      General Appearance:   Alert, cooperative, no distress   HEENT:   Normocephalic, atraumatic, anicteric.     Neck:  Supple, symmetrical, trachea midline   Lungs:   Clear to auscultation bilaterally; no rales, rhonchi or wheezing; respirations unlabored    Heart::   Regular rate and rhythm; no murmur, rub, or gallop.   Abdomen:   Soft, non-tender, non-distended; normal bowel sounds; no masses, no organomegaly    Genitalia:   Deferred    Rectal:   Deferred    Extremities:  No cyanosis, clubbing or edema    Pulses:  2+ and symmetric    Skin:  No jaundice, rashes, or lesions    Lymph nodes:  No palpable cervical lymphadenopathy        Lab Results:   No visits with results within 1 Day(s) from this visit.   Latest known visit with results is:   Appointment on 01/24/2024   Component Date Value    Sodium 01/24/2024 142     Potassium 01/24/2024 3.5     Chloride 01/24/2024 105     CO2 01/24/2024 30 "     ANION GAP 01/24/2024 7     BUN 01/24/2024 18     Creatinine 01/24/2024 0.70     Glucose, Fasting 01/24/2024 92     Calcium 01/24/2024 9.3     AST 01/24/2024 24     ALT 01/24/2024 25     Alkaline Phosphatase 01/24/2024 77     Total Protein 01/24/2024 6.8     Albumin 01/24/2024 4.4     Total Bilirubin 01/24/2024 0.90     eGFR 01/24/2024 97     WBC 01/24/2024 6.21     RBC 01/24/2024 5.14 (H)     Hemoglobin 01/24/2024 15.4     Hematocrit 01/24/2024 44.9     MCV 01/24/2024 87     MCH 01/24/2024 30.0     MCHC 01/24/2024 34.3     RDW 01/24/2024 12.5     Platelets 01/24/2024 237     MPV 01/24/2024 10.6     C4, COMPLEMENT 01/24/2024 30     C3 Complement 01/24/2024 130     Sed Rate 01/24/2024 5          Radiology Results:   XR thumb right first digit-min 2v    Result Date: 7/11/2024  Narrative: EXAM: XR FINGER LEFT 2+ VIEWS COMPARISON: None available HISTORY:  [Finger FINDINGS: No acute bony abnormality.   Alignment appears normal. Joint spaces are maintained. Mild soft tissue swelling in the fourth digit. No radiopaque foreign body.    Impression: IMPRESSION: 1.  Soft tissue swelling without underlying bony abnormality. No radiopaque foreign body. Workstation:[id number]

## 2024-08-06 NOTE — TELEPHONE ENCOUNTER
Best contact number for patient: [phone]    Referring provider and telephone number: Joes Ramon Keith    Primary Care Provider Name and if affiliated with [facility]:     Reason for Appointment/Dx: Migraines/Rule out MS/patient would like Dr Hannah Ta to look at previous labs    Have you seen and followed up with a pediatric Neurologist for this disease in the past?  No    Neurology Location patient would like to be seen: Any    Order received? Yes     Records Received? Yes    Have you ever seen another Neurologist?      Yes, Dr Tony Husbands Name: [facility] ID# [id number]    Notes: Patient scheduled, added to waitlist and mailed new patient packet      Appointment date: 06/17/21 with Dr Hannah aT [Subsequent Evaluation] : a subsequent evaluation for [FreeTextEntry2] : s/p sinusitis

## 2024-08-12 ENCOUNTER — APPOINTMENT (OUTPATIENT)
Dept: LAB | Facility: HOSPITAL | Age: 59
End: 2024-08-12
Payer: COMMERCIAL

## 2024-08-12 DIAGNOSIS — M35.00 SICCA SYNDROME (HCC): ICD-10-CM

## 2024-08-12 DIAGNOSIS — E55.9 VITAMIN D DEFICIENCY DISEASE: ICD-10-CM

## 2024-08-12 LAB
25(OH)D3 SERPL-MCNC: 46.9 NG/ML (ref 30–100)
ALBUMIN SERPL BCG-MCNC: 4.5 G/DL (ref 3.5–5)
ALP SERPL-CCNC: 83 U/L (ref 34–104)
ALT SERPL W P-5'-P-CCNC: 29 U/L (ref 7–52)
ANION GAP SERPL CALCULATED.3IONS-SCNC: 7 MMOL/L (ref 4–13)
AST SERPL W P-5'-P-CCNC: 25 U/L (ref 13–39)
BACTERIA UR QL AUTO: ABNORMAL /HPF
BILIRUB SERPL-MCNC: 0.88 MG/DL (ref 0.2–1)
BILIRUB UR QL STRIP: NEGATIVE
BUN SERPL-MCNC: 18 MG/DL (ref 5–25)
C3 SERPL-MCNC: 142 MG/DL (ref 87–200)
C4 SERPL-MCNC: 37 MG/DL (ref 19–52)
CALCIUM SERPL-MCNC: 9.5 MG/DL (ref 8.4–10.2)
CHLORIDE SERPL-SCNC: 104 MMOL/L (ref 96–108)
CLARITY UR: CLEAR
CO2 SERPL-SCNC: 31 MMOL/L (ref 21–32)
COLOR UR: ABNORMAL
CREAT SERPL-MCNC: 0.74 MG/DL (ref 0.6–1.3)
ERYTHROCYTE [DISTWIDTH] IN BLOOD BY AUTOMATED COUNT: 12.4 % (ref 11.6–15.1)
GFR SERPL CREATININE-BSD FRML MDRD: 90 ML/MIN/1.73SQ M
GLUCOSE P FAST SERPL-MCNC: 99 MG/DL (ref 65–99)
GLUCOSE UR STRIP-MCNC: NEGATIVE MG/DL
HCT VFR BLD AUTO: 46.5 % (ref 34.8–46.1)
HGB BLD-MCNC: 15.9 G/DL (ref 11.5–15.4)
HGB UR QL STRIP.AUTO: NEGATIVE
KETONES UR STRIP-MCNC: NEGATIVE MG/DL
LEUKOCYTE ESTERASE UR QL STRIP: ABNORMAL
MCH RBC QN AUTO: 29.6 PG (ref 26.8–34.3)
MCHC RBC AUTO-ENTMCNC: 34.2 G/DL (ref 31.4–37.4)
MCV RBC AUTO: 86 FL (ref 82–98)
MUCOUS THREADS UR QL AUTO: ABNORMAL
NITRITE UR QL STRIP: NEGATIVE
NON-SQ EPI CELLS URNS QL MICRO: ABNORMAL /HPF
PH UR STRIP.AUTO: 6 [PH]
PLATELET # BLD AUTO: 252 THOUSANDS/UL (ref 149–390)
PMV BLD AUTO: 10 FL (ref 8.9–12.7)
POTASSIUM SERPL-SCNC: 3.5 MMOL/L (ref 3.5–5.3)
PROT SERPL-MCNC: 7.2 G/DL (ref 6.4–8.4)
PROT UR STRIP-MCNC: ABNORMAL MG/DL
RBC # BLD AUTO: 5.38 MILLION/UL (ref 3.81–5.12)
RBC #/AREA URNS AUTO: ABNORMAL /HPF
SODIUM SERPL-SCNC: 142 MMOL/L (ref 135–147)
SP GR UR STRIP.AUTO: 1.02 (ref 1–1.03)
UROBILINOGEN UR STRIP-ACNC: <2 MG/DL
WBC # BLD AUTO: 8.61 THOUSAND/UL (ref 4.31–10.16)
WBC #/AREA URNS AUTO: ABNORMAL /HPF

## 2024-08-12 PROCEDURE — 86160 COMPLEMENT ANTIGEN: CPT

## 2024-08-12 PROCEDURE — 85027 COMPLETE CBC AUTOMATED: CPT

## 2024-08-12 PROCEDURE — 36415 COLL VENOUS BLD VENIPUNCTURE: CPT

## 2024-08-12 PROCEDURE — 82306 VITAMIN D 25 HYDROXY: CPT

## 2024-08-12 PROCEDURE — 81001 URINALYSIS AUTO W/SCOPE: CPT

## 2024-08-12 PROCEDURE — 87086 URINE CULTURE/COLONY COUNT: CPT

## 2024-08-12 PROCEDURE — 80053 COMPREHEN METABOLIC PANEL: CPT

## 2024-08-13 LAB — BACTERIA UR CULT: NORMAL

## 2024-09-09 ENCOUNTER — TELEPHONE (OUTPATIENT)
Age: 59
End: 2024-09-09

## 2024-09-09 NOTE — TELEPHONE ENCOUNTER
Pt under care of Dr. Yuliya Payan. She no longer takes her [facility] insurance she this will be a BOAZ visit for Interstitial Cystitis and mentioned that she is due for a treatment. Could she be seen at this office for that Dx and treatments? Dr. Payan's office will be faxing you the patient office notes for the past 2 years before her initial appointment.    CB: [phone]

## 2024-09-11 ENCOUNTER — OFFICE VISIT (OUTPATIENT)
Dept: UROLOGY | Facility: CLINIC | Age: 59
End: 2024-09-11
Payer: COMMERCIAL

## 2024-09-11 VITALS
HEART RATE: 79 BPM | TEMPERATURE: 98.2 F | DIASTOLIC BLOOD PRESSURE: 82 MMHG | HEIGHT: 63 IN | SYSTOLIC BLOOD PRESSURE: 126 MMHG | OXYGEN SATURATION: 97 % | WEIGHT: 141 LBS | BODY MASS INDEX: 24.98 KG/M2

## 2024-09-11 DIAGNOSIS — R31.29 MICROHEMATURIA: Primary | ICD-10-CM

## 2024-09-11 DIAGNOSIS — N30.10 INTERSTITIAL CYSTITIS: Primary | ICD-10-CM

## 2024-09-11 LAB
BACTERIA UR QL AUTO: ABNORMAL /HPF
BILIRUB UR QL STRIP: NEGATIVE
CLARITY UR: ABNORMAL
COLOR UR: YELLOW
GLUCOSE UR STRIP-MCNC: NEGATIVE MG/DL
HGB UR QL STRIP.AUTO: NEGATIVE
KETONES UR STRIP-MCNC: NEGATIVE MG/DL
LEUKOCYTE ESTERASE UR QL STRIP: ABNORMAL
MUCOUS THREADS UR QL AUTO: ABNORMAL
NITRITE UR QL STRIP: NEGATIVE
NON-SQ EPI CELLS URNS QL MICRO: ABNORMAL /HPF
PH UR STRIP.AUTO: 6 [PH]
POST-VOID RESIDUAL VOLUME, ML POC: 124 ML
PROT UR STRIP-MCNC: ABNORMAL MG/DL
RBC #/AREA URNS AUTO: ABNORMAL /HPF
SL AMB  POCT GLUCOSE, UA: NORMAL
SL AMB LEUKOCYTE ESTERASE,UA: NORMAL
SL AMB POCT BILIRUBIN,UA: NORMAL
SL AMB POCT BLOOD,UA: NORMAL
SL AMB POCT CLARITY,UA: NORMAL
SL AMB POCT COLOR,UA: YELLOW
SL AMB POCT KETONES,UA: NORMAL
SL AMB POCT NITRITE,UA: NORMAL
SL AMB POCT PH,UA: 5
SL AMB POCT SPECIFIC GRAVITY,UA: 1.02
SL AMB POCT URINE PROTEIN: NORMAL
SL AMB POCT UROBILINOGEN: 0.2
SP GR UR STRIP.AUTO: 1.02 (ref 1–1.03)
UROBILINOGEN UR STRIP-ACNC: <2 MG/DL
WBC #/AREA URNS AUTO: ABNORMAL /HPF

## 2024-09-11 PROCEDURE — 99204 OFFICE O/P NEW MOD 45 MIN: CPT | Performed by: PHYSICIAN ASSISTANT

## 2024-09-11 PROCEDURE — 51798 US URINE CAPACITY MEASURE: CPT | Performed by: PHYSICIAN ASSISTANT

## 2024-09-11 PROCEDURE — 81002 URINALYSIS NONAUTO W/O SCOPE: CPT | Performed by: PHYSICIAN ASSISTANT

## 2024-09-11 PROCEDURE — 81001 URINALYSIS AUTO W/SCOPE: CPT | Performed by: PHYSICIAN ASSISTANT

## 2024-09-11 RX ORDER — OXCARBAZEPINE 150 MG/1
TABLET, FILM COATED ORAL
COMMUNITY
Start: 2024-08-23

## 2024-09-11 RX ORDER — MAGNESIUM OXIDE 400 MG/1
TABLET ORAL
COMMUNITY
Start: 2024-08-08

## 2024-09-11 NOTE — PROGRESS NOTES
"9/11/2024      Chief Complaint   Patient presents with    Urinary Urgency    Urinary Tract Infection    Urinary Frequency         Assessment and Plan    58 y.o. female new patient     IC  - Managed by Dr. Payan for many years with bladder instillations several times per year, last reported treatment in May 2024. No records to review  - Last cystoscopy reportedly over 2 years ago. 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 Recommend further evaluation with repeat cystoscopy   - Obtain records. Consider resumption of bladder instillation treatments vs alternative therapies. Discussed oral medication options and pelvic floor PT which she declines at this time. Can use Azo and Prelief OTC PRN  - Urine dip + leuks and trace blood. Asymptomatic currently. Sent out for UA micro. If >3 RBCs per hpf, will additionally order upper tract imaging  - PVR today elevated at 124 mL. Recommend timed voiding and double voiding. Further work-up with cystoscopy as above.       History of Present Illness  Nel Guido is a 58 y.o. female here for new patient evaluation of interstitial cystitis. She is transferring care from Dr. Yuliya Payan due to insurance and provider retiring. No records on file for review. She reports her symptoms began in 2011 initially. She saw outside urologist at that time and was diagnosed with an \"ulcer on her bladder\" and reports having an in office procedure. She has been seeing Dr. Payan sine 2016 and reports she has been receiving bladder instillations several times per year with overall good benefit. She is not aware of what medication is used for the instillations and I do not have records available for review. Her last treatment was in May of this year. She denies ever trying daily medication for IC management. She failed OTC medications in the past and dietary modifications. Reports her symptoms are dysuria and pelvic pain when she has flare. Denies any hematuria or incontinence.     Review " of Systems   Constitutional:  Negative for chills and fever.   Respiratory:  Negative for shortness of breath.    Cardiovascular:  Negative for chest pain.   Gastrointestinal:  Negative for abdominal pain.   Genitourinary:  Positive for frequency. Negative for difficulty urinating, dysuria, flank pain, hematuria, pelvic pain and urgency.   Neurological:  Negative for dizziness.                  Past Medical History  Past Medical History:   Diagnosis Date    GERD (gastroesophageal reflux disease)     Irritable bowel syndrome with constipation     Sjogren's disease (HCC) 02/2022       Past Social History  Past Surgical History:   Procedure Laterality Date    APPENDECTOMY  1997    KNEE SURGERY Right 2000     Social History     Tobacco Use   Smoking Status Never    Passive exposure: Never   Smokeless Tobacco Never       Past Family History  Family History   Adopted: Yes   Family history unknown: Yes       Past Social history  Social History     Socioeconomic History    Marital status: Single     Spouse name: Not on file    Number of children: Not on file    Years of education: Not on file    Highest education level: Not on file   Occupational History    Not on file   Tobacco Use    Smoking status: Never     Passive exposure: Never    Smokeless tobacco: Never   Vaping Use    Vaping status: Never Used   Substance and Sexual Activity    Alcohol use: Never    Drug use: Never    Sexual activity: Not Currently   Other Topics Concern    Not on file   Social History Narrative    Not on file     Social Determinants of Health     Financial Resource Strain: Not on file   Food Insecurity: Not on file   Transportation Needs: Not on file   Physical Activity: Not on file   Stress: Not on file   Social Connections: Unknown (6/18/2024)    Received from [facility]     How often do you feel lonely or isolated from those around you? (Adult - for ages 18 years and over): Not on file   Intimate Partner Violence: Not on  file   Housing Stability: Not on file       Current Medications  Current Outpatient Medications   Medication Sig Dispense Refill    albuterol (PROVENTIL HFA,VENTOLIN HFA) 90 mcg/act inhaler Inhale 2 puffs      amLODIPine (NORVASC) 5 mg tablet Take 5 mg by mouth daily      Azelastine HCl 0.15 % SOLN into each nostril      baclofen 10 mg tablet 10 mg 2 (two) times a day Takes a night      Calcium Carbonate 1500 (600 Ca) MG TABS Take 1 tablet by mouth in the morning      Cetirizine HCl (ZyrTEC Allergy) 10 MG CAPS Take 1 tablet by mouth      chloroquine (ARALEN) 250 MG tablet Take 250 mg by mouth 3 (three) times a week      Cyanocobalamin (VITAMIN B12 PO) Take by mouth in the morning      cycloSPORINE (RESTASIS) 0.05 % ophthalmic emulsion Administer 1 drop to both eyes as needed      Diclofenac Sodium (VOLTAREN) 1 % APPLY 2 GRAMS TO THE AFFECTED AREA(S) BY TOPICAL ROUTE 2-4 TIMES PER DAY      ergocalciferol (VITAMIN D2) 50,000 units       Evening Primrose Oil 500 MG CAPS Take by mouth      famotidine (PEPCID) 40 MG tablet TAKE 1 TABLET BY MOUTH TWICE A  tablet 1    Fluticasone Furoate-Vilanterol (Breo Ellipta) 100-25 mcg/actuation inhaler Inhale 1 puff daily Rinse mouth after use. 60 blister 0    Klor-Con M10 10 MEQ tablet Take 30 mEq by mouth daily      loteprednol etabonate (LOTEMAX) 0.5 % ophthalmic suspension INSTILL 1 DROP INTO BOTH EYES TWICE A DAY      magnesium oxide (MAG-OX) 400 mg tablet       meloxicam (MOBIC) 15 mg tablet       montelukast (SINGULAIR) 10 mg tablet TAKE 1 TABLET BY MOUTH EVERY DAY 90 tablet 1    omeprazole (PriLOSEC) 40 MG capsule TAKE 1 CAPSULE (40 MG TOTAL) BY MOUTH DAILY. 100 capsule 1    OXcarbazepine (TRILEPTAL) 150 mg tablet Take by mouth      pilocarpine (SALAGEN) 5 mg tablet Take by mouth      polyethylene glycol (GLYCOLAX) 17 GM/SCOOP powder Take 17 g by mouth daily      pregabalin (LYRICA) 100 mg capsule Take 100 mg by mouth 2 (two) times a day      vitamin E 100 UNIT capsule  Take 100 Units by mouth      famotidine (PEPCID) 20 mg tablet Take 1 tablet (20 mg total) by mouth daily (Patient not taking: Reported on 9/11/2024) 90 tablet 2    ketorolac (ACULAR) 0.5 % ophthalmic solution  (Patient not taking: Reported on 7/23/2024)      meloxicam (MOBIC) 7.5 mg tablet Take 15 mg by mouth daily at bedtime (Patient not taking: Reported on 7/23/2024)      mirtazapine (REMERON) 15 mg tablet  (Patient not taking: Reported on 9/11/2024)      ofloxacin (OCUFLOX) 0.3 % ophthalmic solution  (Patient not taking: Reported on 7/23/2024)      prednisoLONE acetate (PRED FORTE) 1 % ophthalmic suspension  (Patient not taking: Reported on 7/23/2024)      predniSONE 20 mg tablet TAKE 2 TABLETS BY MOUTH WITH FOOD EVERY DAY FOR 5 DAYS (Patient not taking: Reported on 9/11/2024)      pregabalin (LYRICA) 50 mg capsule Take by mouth (Patient not taking: Reported on 7/23/2024)      rifaximin (XIFAXAN) 200 mg tablet Take 200 mg by mouth 3 (three) times a day (Patient not taking: Reported on 5/28/2024)       No current facility-administered medications for this visit.       Allergies  Allergies   Allergen Reactions    Trazodone Other (See Comments)    Clindamycin Hives and Other (See Comments)    Naproxen Hives and Other (See Comments)    Penicillins Hives, Itching, Other (See Comments), Rash and Edema    Gabapentin Other (See Comments)    Latex Itching    Rofecoxib Other (See Comments)    Sulindac Other (See Comments)    Tramadol Syncope    Bacitracin Rash    Gadolinium Derivatives Itching and Rash     Action Taken: Medrol & Benadryl Prep Prior to MRIs;     Gluten Meal - Food Allergy Rash    Lamotrigine Other (See Comments) and Rash    Medical Tape Rash    Oxcarbazepine Hives, Itching, Other (See Comments) and Rash    Shellfish-Derived Products - Food Allergy Hives    Sulfamethoxazole-Trimethoprim Hives, Rash, Other (See Comments) and Edema          Tiagabine Other (See Comments) and Rash    Zonisamide Other (See  "Comments) and Rash         The following portions of the patient's history were reviewed and updated as appropriate: allergies, current medications, past medical history, past social history, past surgical history and problem list.      Vitals  Vitals:    09/11/24 1138   BP: 126/82   Pulse: 79   Temp: 98.2 °F (36.8 °C)   TempSrc: Temporal   SpO2: 97%   Weight: 64 kg (141 lb)   Height: 5' 3\" (1.6 m)           Physical Exam  Physical Exam  Constitutional:       Appearance: Normal appearance.   HENT:      Head: Normocephalic.      Right Ear: External ear normal.      Left Ear: External ear normal.      Nose: Nose normal.   Eyes:      General: No scleral icterus.     Conjunctiva/sclera: Conjunctivae normal.   Cardiovascular:      Pulses: Normal pulses.   Pulmonary:      Effort: Pulmonary effort is normal.   Musculoskeletal:         General: Normal range of motion.      Cervical back: Normal range of motion.   Neurological:      General: No focal deficit present.      Mental Status: She is alert and oriented to person, place, and time.   Psychiatric:         Mood and Affect: Mood normal.         Behavior: Behavior normal.         Thought Content: Thought content normal.         Judgment: Judgment normal.           Results  Recent Results (from the past 1 hour(s))   POCT urine dip    Collection Time: 09/11/24 11:31 AM   Result Value Ref Range    LEUKOCYTE ESTERASE,UA +     NITRITE,UA -     SL AMB POCT UROBILINOGEN 0.2     POCT URINE PROTEIN +      PH,UA 5.0     BLOOD,UA trace     SPECIFIC GRAVITY,UA 1.025     KETONES,UA -     BILIRUBIN,UA -     GLUCOSE, UA -      COLOR,UA Yellow     CLARITY,UA Cloudy    POCT Measure PVR    Collection Time: 09/11/24 11:40 AM   Result Value Ref Range    POST-VOID RESIDUAL VOLUME, ML  mL   ]  No results found for: \"PSA\"  Lab Results   Component Value Date    CALCIUM 9.5 08/12/2024    K 3.5 08/12/2024    CO2 31 08/12/2024     08/12/2024    BUN 18 08/12/2024    CREATININE 0.74 " 08/12/2024     Lab Results   Component Value Date    WBC 8.61 08/12/2024    HGB 15.9 (H) 08/12/2024    HCT 46.5 (H) 08/12/2024    MCV 86 08/12/2024     08/12/2024           Orders  Orders Placed This Encounter   Procedures    POCT Measure PVR    POCT urine dip       Jeannie Kerr

## 2024-09-12 ENCOUNTER — TELEPHONE (OUTPATIENT)
Dept: UROLOGY | Facility: CLINIC | Age: 59
End: 2024-09-12

## 2024-09-12 NOTE — TELEPHONE ENCOUNTER
Spoke with pt relaying Jeannie BANUELOS message,  UA positive for microhematuria. CT urogram ordered for further work-up, BMP prior to imaging. Proceed with cystoscopy as scheduled  Pt verbalized understanding.      Pt inquire what is a CT urogram, informed pt diagnostic tool for detecting conditions that affect your urinary system. Pt verbalized understand. Provided pt with C/S number. No further assistance   ----- Message from Jeannie Kerr PA-C sent at 9/11/2024  8:12 PM EDT -----  UA positive for microhematuria. CT urogram ordered for further work-up, BMP prior to imaging. Proceed with cystoscopy as scheduled

## 2024-09-16 ENCOUNTER — APPOINTMENT (OUTPATIENT)
Dept: LAB | Facility: HOSPITAL | Age: 59
End: 2024-09-16
Payer: COMMERCIAL

## 2024-09-16 DIAGNOSIS — R31.29 MICROHEMATURIA: ICD-10-CM

## 2024-09-16 LAB
ANION GAP SERPL CALCULATED.3IONS-SCNC: 6 MMOL/L (ref 4–13)
BUN SERPL-MCNC: 16 MG/DL (ref 5–25)
CALCIUM SERPL-MCNC: 9.2 MG/DL (ref 8.4–10.2)
CHLORIDE SERPL-SCNC: 105 MMOL/L (ref 96–108)
CO2 SERPL-SCNC: 31 MMOL/L (ref 21–32)
CREAT SERPL-MCNC: 0.71 MG/DL (ref 0.6–1.3)
GFR SERPL CREATININE-BSD FRML MDRD: 95 ML/MIN/1.73SQ M
GLUCOSE P FAST SERPL-MCNC: 97 MG/DL (ref 65–99)
POTASSIUM SERPL-SCNC: 3.7 MMOL/L (ref 3.5–5.3)
SODIUM SERPL-SCNC: 142 MMOL/L (ref 135–147)

## 2024-09-16 PROCEDURE — 36415 COLL VENOUS BLD VENIPUNCTURE: CPT

## 2024-09-16 PROCEDURE — 80048 BASIC METABOLIC PNL TOTAL CA: CPT

## 2024-09-27 ENCOUNTER — HOSPITAL ENCOUNTER (OUTPATIENT)
Dept: CT IMAGING | Facility: HOSPITAL | Age: 59
End: 2024-09-27
Payer: COMMERCIAL

## 2024-09-27 DIAGNOSIS — R31.29 MICROHEMATURIA: ICD-10-CM

## 2024-09-27 PROCEDURE — 74178 CT ABD&PLV WO CNTR FLWD CNTR: CPT

## 2024-09-27 RX ADMIN — IOHEXOL 100 ML: 350 INJECTION, SOLUTION INTRAVENOUS at 06:19

## 2024-10-02 ENCOUNTER — APPOINTMENT (OUTPATIENT)
Dept: LAB | Facility: HOSPITAL | Age: 59
End: 2024-10-02
Payer: COMMERCIAL

## 2024-10-02 DIAGNOSIS — M35.00 SICCA SYNDROME (HCC): ICD-10-CM

## 2024-10-02 LAB
ALBUMIN SERPL BCG-MCNC: 4.6 G/DL (ref 3.5–5)
ALP SERPL-CCNC: 87 U/L (ref 34–104)
ALT SERPL W P-5'-P-CCNC: 19 U/L (ref 7–52)
ANION GAP SERPL CALCULATED.3IONS-SCNC: 8 MMOL/L (ref 4–13)
AST SERPL W P-5'-P-CCNC: 18 U/L (ref 13–39)
BILIRUB SERPL-MCNC: 0.69 MG/DL (ref 0.2–1)
BUN SERPL-MCNC: 15 MG/DL (ref 5–25)
CALCIUM SERPL-MCNC: 9.5 MG/DL (ref 8.4–10.2)
CHLORIDE SERPL-SCNC: 104 MMOL/L (ref 96–108)
CO2 SERPL-SCNC: 29 MMOL/L (ref 21–32)
CREAT SERPL-MCNC: 0.72 MG/DL (ref 0.6–1.3)
ERYTHROCYTE [DISTWIDTH] IN BLOOD BY AUTOMATED COUNT: 13 % (ref 11.6–15.1)
GFR SERPL CREATININE-BSD FRML MDRD: 93 ML/MIN/1.73SQ M
GLUCOSE P FAST SERPL-MCNC: 109 MG/DL (ref 65–99)
HCT VFR BLD AUTO: 48 % (ref 34.8–46.1)
HGB BLD-MCNC: 16.4 G/DL (ref 11.5–15.4)
MCH RBC QN AUTO: 29.4 PG (ref 26.8–34.3)
MCHC RBC AUTO-ENTMCNC: 34.2 G/DL (ref 31.4–37.4)
MCV RBC AUTO: 86 FL (ref 82–98)
PLATELET # BLD AUTO: 275 THOUSANDS/UL (ref 149–390)
PMV BLD AUTO: 9.6 FL (ref 8.9–12.7)
POTASSIUM SERPL-SCNC: 4.1 MMOL/L (ref 3.5–5.3)
PROT SERPL-MCNC: 7.3 G/DL (ref 6.4–8.4)
RBC # BLD AUTO: 5.58 MILLION/UL (ref 3.81–5.12)
SODIUM SERPL-SCNC: 141 MMOL/L (ref 135–147)
WBC # BLD AUTO: 10.07 THOUSAND/UL (ref 4.31–10.16)

## 2024-10-02 PROCEDURE — 80053 COMPREHEN METABOLIC PANEL: CPT

## 2024-10-02 PROCEDURE — 36415 COLL VENOUS BLD VENIPUNCTURE: CPT

## 2024-10-02 PROCEDURE — 85027 COMPLETE CBC AUTOMATED: CPT

## 2024-10-22 ENCOUNTER — TELEPHONE (OUTPATIENT)
Age: 59
End: 2024-10-22

## 2024-10-22 NOTE — TELEPHONE ENCOUNTER
Pt transferred to nurses line.     She wanted to know name of antibx she was given in July for IBS and since she has appt tomorrow if we can have samples ready for her if her new insurance would not cover medication.     I advised our offices are not supply samples of medications any more but at OV tomorrow can discuss next plan of care. Pt understood.

## 2024-10-23 ENCOUNTER — OFFICE VISIT (OUTPATIENT)
Age: 59
End: 2024-10-23
Payer: COMMERCIAL

## 2024-10-23 VITALS
OXYGEN SATURATION: 98 % | BODY MASS INDEX: 24.45 KG/M2 | WEIGHT: 138 LBS | SYSTOLIC BLOOD PRESSURE: 115 MMHG | DIASTOLIC BLOOD PRESSURE: 70 MMHG | HEART RATE: 93 BPM

## 2024-10-23 DIAGNOSIS — K58.9 IRRITABLE BOWEL SYNDROME, UNSPECIFIED TYPE: Primary | ICD-10-CM

## 2024-10-23 PROCEDURE — 99214 OFFICE O/P EST MOD 30 MIN: CPT | Performed by: PHYSICIAN ASSISTANT

## 2024-10-23 NOTE — PROGRESS NOTES
[facility] - Outpatient Follow-up Note  Nel Guido 58 y.o. female MRN: [id number]  Encounter: [id number]          ASSESSMENT AND PLAN:      1. Irritable bowel syndrome    Patient presents for follow up of her IBS.  She has a history of IBS and SIBO.  She previously followed with a GI in [place]. Colonoscopy with visualization of the terminal ileum in May of 2021 which showed a 6mm adenoma that was removed and hemorrhoids (repeat colonoscopy recommended in 5 years - 5/2026).  She also had an EGD in May of 2021 which was normal and biopsies were negative for h pylori and celiac.  She also previously had negative celiac serology.  She was previously treated for SIBO with doxycycline per records in 2021. She was treated over the summer with a Xifaxan 550mg po TID x 14 days course with improvement and also was on a Xifaxan course in 2023 with improvement..  She is taking Miralax at night with overall regulation of her bowel movements.  She is on a modified low FODMAP diet. She is gluten-free and lactose-free. She cannot take Bentyl due to her Sjogren's. She reports Metamucil worsened her pain.    Continue current diet modifications and Miralax.  Follow up in 6 months or sooner if needed.  If symptoms return in the future, can consider another Xifaxan course.  ______________________________________________________________________    SUBJECTIVE:  Patient is a pleasant 58 year old female who presents to the office for follow up of her IBS.  She also has GERD on Omeprazole. Patient reports overall she is doing fairly well.  She reports she had good improvement of her symptoms over the summer with the Xifaxan course.  Of note her insurance would not cover it and she did receive samples.  She reports her medical insurance will be changing shortly.  She is taking MiraLAX at night and reports 1-2 bowel movements a day generally.  She also reports she recently noted that the oats she was adding to  her smoothies seem to be causing more bloating and she removed the oats.  She has noticed improvement.  She recently just had foot surgery.  She reports she did have some gastrointestinal symptoms due to the anesthesia which have since subsided.  She also reports she will be starting a new job soon.      REVIEW OF SYSTEMS IS OTHERWISE NEGATIVE.      Historical Information   Past Medical History:   Diagnosis Date    GERD (gastroesophageal reflux disease)     Irritable bowel syndrome with constipation     Sjogren's disease (HCC) 02/2022     Past Surgical History:   Procedure Laterality Date    APPENDECTOMY  1997    KNEE SURGERY Right 2000     Social History   Social History     Substance and Sexual Activity   Alcohol Use Never     Social History     Substance and Sexual Activity   Drug Use Never     Social History     Tobacco Use   Smoking Status Never    Passive exposure: Never   Smokeless Tobacco Never     Family History   Adopted: Yes   Family history unknown: Yes       Meds/Allergies       Current Outpatient Medications:     albuterol (PROVENTIL HFA,VENTOLIN HFA) 90 mcg/act inhaler    amLODIPine (NORVASC) 5 mg tablet    baclofen 10 mg tablet    Calcium Carbonate 1500 (600 Ca) MG TABS    Cetirizine HCl (ZyrTEC Allergy) 10 MG CAPS    chloroquine (ARALEN) 250 MG tablet    Cyanocobalamin (VITAMIN B12 PO)    Diclofenac Sodium (VOLTAREN) 1 %    ergocalciferol (VITAMIN D2) 50,000 units    Evening Primrose Oil 500 MG CAPS    famotidine (PEPCID) 40 MG tablet    Klor-Con M10 10 MEQ tablet    magnesium oxide (MAG-OX) 400 mg tablet    meloxicam (MOBIC) 15 mg tablet    montelukast (SINGULAIR) 10 mg tablet    omeprazole (PriLOSEC) 40 MG capsule    pilocarpine (SALAGEN) 5 mg tablet    polyethylene glycol (GLYCOLAX) 17 GM/SCOOP powder    vitamin E 100 UNIT capsule    Fluticasone Furoate-Vilanterol (Breo Ellipta) 100-25 mcg/actuation inhaler    Allergies   Allergen Reactions    Trazodone Other (See Comments)    Clindamycin Hives  and Other (See Comments)    Naproxen Hives and Other (See Comments)    Penicillins Hives, Itching, Other (See Comments), Rash and Edema    Gabapentin Other (See Comments)    Latex Itching    Rofecoxib Other (See Comments)    Sulindac Other (See Comments)    Tramadol Syncope    Bacitracin Rash    Gadolinium Derivatives Itching and Rash     Action Taken: Medrol & Benadryl Prep Prior to MRIs;     Gluten Meal - Food Allergy Rash    Lamotrigine Other (See Comments) and Rash    Medical Tape Rash    Oxcarbazepine Hives, Itching, Other (See Comments) and Rash    Shellfish-Derived Products - Food Allergy Hives    Sulfamethoxazole-Trimethoprim Hives, Rash, Other (See Comments) and Edema          Tiagabine Other (See Comments) and Rash    Zonisamide Other (See Comments) and Rash           Objective     Blood pressure 115/70, pulse 93, weight 62.6 kg (138 lb), SpO2 98%. Body mass index is 24.45 kg/m².      PHYSICAL EXAM:      General Appearance:   Alert, cooperative, no distress   HEENT:   Normocephalic, atraumatic, anicteric.     Neck:  Supple, symmetrical, trachea midline   Lungs:   Clear to auscultation bilaterally; no rales, rhonchi or wheezing; respirations unlabored    Heart::   Regular rate and rhythm; no murmur, rub, or gallop.   Abdomen:   Soft, non-tender, non-distended; normal bowel sounds; no masses, no organomegaly    Genitalia:   Deferred    Rectal:   Deferred    Extremities:  No cyanosis, clubbing or edema    Pulses:  2+ and symmetric    Skin:  No jaundice, rashes, or lesions    Lymph nodes:  No palpable cervical lymphadenopathy        Lab Results:   No visits with results within 1 Day(s) from this visit.   Latest known visit with results is:   Appointment on 10/02/2024   Component Date Value    Sodium 10/02/2024 141     Potassium 10/02/2024 4.1     Chloride 10/02/2024 104     CO2 10/02/2024 29     ANION GAP 10/02/2024 8     BUN 10/02/2024 15     Creatinine 10/02/2024 0.72     Glucose, Fasting 10/02/2024 109 (H)      Calcium 10/02/2024 9.5     AST 10/02/2024 18     ALT 10/02/2024 19     Alkaline Phosphatase 10/02/2024 87     Total Protein 10/02/2024 7.3     Albumin 10/02/2024 4.6     Total Bilirubin 10/02/2024 0.69     eGFR 10/02/2024 93     WBC 10/02/2024 10.07     RBC 10/02/2024 5.58 (H)     Hemoglobin 10/02/2024 16.4 (H)     Hematocrit 10/02/2024 48.0 (H)     MCV 10/02/2024 86     MCH 10/02/2024 29.4     MCHC 10/02/2024 34.2     RDW 10/02/2024 13.0     Platelets 10/02/2024 275     MPV 10/02/2024 9.6          Radiology Results:   XR wrist 3+ vw left    Result Date: 10/11/2024  Narrative: Radiology: XR interpretation limited to just orthopedic interpretation. Left wrist 3+ view(s) done in clinic today shows no fractures or dislocations, no degenerative joint disease or any other bone lesions.    CT renal protocol    Result Date: 9/30/2024  Narrative: CT ABDOMEN AND PELVIS WITH AND WITHOUT IV CONTRAST INDICATION:   Other microscopic hematuria.   COMPARISON:  None. TECHNIQUE: Initial CT of the abdomen and pelvis was performed without intravenous contrast.  Subsequent dynamic CT evaluation of the abdomen and pelvis was performed after the administration of intravenous contrast in both nephrographic and delayed phases. Multiplanar 2D reformatted images were created from the source data. This examination, like all CT scans performed in the [facility], was performed utilizing techniques to minimize radiation dose exposure, including the use of iterative reconstruction and automated exposure control. Radiation dose length product (DLP) for this visit: IV Contrast: Enteric Contrast:  Enteric contrast was not administered. FINDINGS: ABDOMEN RIGHT KIDNEY AND URETER: No solid renal mass.  No detectable urothelial mass. No hydronephrosis or hydroureter. 2 mm nonobstructing right renal stone. No perinephric collection. LEFT KIDNEY AND URETER: No solid renal mass.  No detectable urothelial mass. No hydronephrosis or  hydroureter. 3 mm nonobstructing left renal stone. No perinephric collection. URINARY BLADDER: No bladder wall mass. Diffuse bladder wall thickening No calculi. LOWER CHEST:  No clinically significant abnormality identified in the visualized lower chest. LIVER/BILIARY TREE:  Liver is diffusely decreased in density consistent with fatty change.  No CT evidence of suspicious hepatic mass.  Normal hepatic contours.  No biliary dilatation. GALLBLADDER:  No calcified gallstones. No pericholecystic inflammatory change. SPLEEN:  Unremarkable. PANCREAS:  Unremarkable. ADRENAL GLANDS:  Unremarkable. STOMACH AND BOWEL:  Unremarkable. APPENDIX:  No findings to suggest appendicitis. ABDOMINOPELVIC CAVITY:  No ascites.  No free intraperitoneal air. No enlarged para-aortic or pelvic lymph nodes. Subcentimeter aortocaval and retrocaval lymph nodes measure up to 4 mm in short axis image 75 series 5 are nonspecific. VESSELS:  Unremarkable for patient's age. PELVIS REPRODUCTIVE ORGANS: Leiomyomatous uterus with intramural fibroids measuring up to 7.5 cm. ABDOMINAL WALL/INGUINAL REGIONS:  Unremarkable. OSSEOUS STRUCTURES:  No acute fracture or destructive osseous lesion.     Impression: 1. Bilateral nonobstructing renal stones measuring up to 3 mm on the left and 2 mm on the right. No ureteral stones. No bladder stones. 2. No suspicious renal or ureteral lesions. No pelvicalyceal filling defects. 3. Hepatic steatosis. 4. Leiomyomatous uterus. Electronically signed: 09/30/2024 06:02 AM Damian Dewitt MD

## 2024-12-23 ENCOUNTER — TELEPHONE (OUTPATIENT)
Age: 59
End: 2024-12-23

## 2024-12-23 NOTE — TELEPHONE ENCOUNTER
Patient called stating she has an accomodation letter on file and on the first page question number 3.  She stated they need an End date on the form or they will not process it or approve it. She mentioned 12/31/2025.  Please call her to confirm information and please fax it to as soon as possible.   [facility] [employer]  Fax number [phone]    Call her at [phone]

## 2025-01-05 DIAGNOSIS — K21.9 GASTROESOPHAGEAL REFLUX DISEASE WITHOUT ESOPHAGITIS: ICD-10-CM

## 2025-01-07 RX ORDER — OMEPRAZOLE 40 MG/1
40 CAPSULE, DELAYED RELEASE ORAL DAILY
Qty: 90 CAPSULE | Refills: 1 | Status: SHIPPED | OUTPATIENT
Start: 2025-01-07

## 2025-01-07 RX ORDER — FAMOTIDINE 40 MG/1
40 TABLET, FILM COATED ORAL 2 TIMES DAILY
Qty: 180 TABLET | Refills: 1 | Status: SHIPPED | OUTPATIENT
Start: 2025-01-07

## 2025-02-04 ENCOUNTER — PROCEDURE VISIT (OUTPATIENT)
Dept: UROLOGY | Facility: CLINIC | Age: 60
End: 2025-02-04
Payer: COMMERCIAL

## 2025-02-04 VITALS
HEIGHT: 63 IN | DIASTOLIC BLOOD PRESSURE: 80 MMHG | OXYGEN SATURATION: 96 % | BODY MASS INDEX: 22.88 KG/M2 | TEMPERATURE: 96.7 F | HEART RATE: 96 BPM | WEIGHT: 129.1 LBS | SYSTOLIC BLOOD PRESSURE: 122 MMHG

## 2025-02-04 DIAGNOSIS — R35.1 NOCTURIA: ICD-10-CM

## 2025-02-04 DIAGNOSIS — R35.0 URINARY FREQUENCY: ICD-10-CM

## 2025-02-04 DIAGNOSIS — D25.9 UTERINE LEIOMYOMA, UNSPECIFIED LOCATION: ICD-10-CM

## 2025-02-04 DIAGNOSIS — N30.10 INTERSTITIAL CYSTITIS: Primary | ICD-10-CM

## 2025-02-04 LAB
SL AMB  POCT GLUCOSE, UA: NORMAL
SL AMB LEUKOCYTE ESTERASE,UA: NORMAL
SL AMB POCT BILIRUBIN,UA: NORMAL
SL AMB POCT BLOOD,UA: NORMAL
SL AMB POCT CLARITY,UA: CLEAR
SL AMB POCT COLOR,UA: YELLOW
SL AMB POCT KETONES,UA: NORMAL
SL AMB POCT NITRITE,UA: NORMAL
SL AMB POCT PH,UA: 7
SL AMB POCT SPECIFIC GRAVITY,UA: 1.01
SL AMB POCT URINE PROTEIN: NORMAL
SL AMB POCT UROBILINOGEN: 0.2

## 2025-02-04 PROCEDURE — 99213 OFFICE O/P EST LOW 20 MIN: CPT | Performed by: UROLOGY

## 2025-02-04 PROCEDURE — 81002 URINALYSIS NONAUTO W/O SCOPE: CPT | Performed by: UROLOGY

## 2025-02-04 PROCEDURE — 52281 CYSTOSCOPY AND TREATMENT: CPT | Performed by: UROLOGY

## 2025-02-04 RX ORDER — CIPROFLOXACIN 500 MG/1
500 TABLET, FILM COATED ORAL ONCE
Qty: 1 TABLET | Refills: 0 | Status: SHIPPED | OUTPATIENT
Start: 2025-02-04 | End: 2025-02-04

## 2025-02-04 RX ORDER — OXYBUTYNIN CHLORIDE 5 MG/1
5 TABLET, EXTENDED RELEASE ORAL DAILY
Qty: 30 TABLET | Refills: 11 | Status: SHIPPED | OUTPATIENT
Start: 2025-02-04

## 2025-02-04 NOTE — LETTER
2025     Tobias Juarez PA-C  [address] [place] [address]  [address]  [place] [address]    Patient: Nel Guido   YOB: 1965   Date of Visit: 2025       Dear Dr. Juarez:    Thank you for referring Nel Guido to me for evaluation. Below are my notes for this consultation.    If you have questions, please do not hesitate to call me. I look forward to following your patient along with you.         Sincerely,        Ronald Navas MD        CC: No Recipients    Ronald Navas MD  2025  9:00 AM  Sign when Signing Visit  UROLOGY PROGRESS NOTE   [facility]  [address] [place] [address]  [address]  [place] [address]  [phone]  Fax:[phone]  [email or web address]      NAME: Nel uGido  AGE: 59 y.o. SEX: female  : 1965   MRN: [id number]    DATE: 2025  TIME: 9:00 AM    Assessment and Plan:    History of interstitial cystitis-bladder is normal on cystoscopy, and I do not think this is currently an issue.  She is currently able to tolerate spicy or foods etc.  I think the issue with her pressure in her bladder with need for frequent voiding is primarily due to her fibroid uterus which is compressing her bladder.  I have reached out to gynecology to get her seen.  She also has a very stenotic urethra.  This was dilated and we will see how she does after the dilation.  It may improve things.  If it does she may need periodic dilations in the future.    Urinary frequency and nocturia-again I think this is mostly due to the uterus.  There is a possibility of a thickened bladder wall given her lower bladder capacity, but I think it would be reasonable to start her on Ditropan XL 5 mg daily.    I plan on seeing her 3 months with video visit or as needed.               Chief Complaint   No chief complaint on file.      History of Present Illness   59-year-old woman, established patient with new to me-previous seen by Jeannie Kerr PA-C 2024-history of  interstitial cystitis managed by Dr. Yulyia Meléndez for many years with bladder installations last treatment May 2024.  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  He has not had a cystoscopy for 8-9 years.  Here for cystoscopy for further evaluation and consultation with me.  PVR was 124 cc last office visit.  Urinalysis last office visit showed large leukocyte esterase innumerable white blood cells 4-10 red blood cells per high-power field a lot of mucus threads and moderate epithelial cells and only occasional bacteria.  No urine culture was done with that but urine culture August 12, 2024 was negative.  CT renal protocol September 27, 2024 showed bilateral nonobstructing kidney stones measuring up to 3 mm on the left and 2 mm on the right.  Otherwise unremarkable.  I personally reviewed her CT scan however and she has a very large uterus with fibroids and calcifications which take up quite a bit of space in her pelvis.  The bladder is also very thick-walled.  Current symptoms are feeling pressure in her bladder after she urinates and having to urinate in 15 minutes.  No pain.  Nocturia 4-5 times a night.  No gross hematuria.  She has not been on overactive bladder medications that she remembers.  She has 19 listed allergies.       Cystoscopy     Date/Time  2/4/2025 8:30 AM     Performed by  Ronald Navas MD   Authorized by  Ronald Navas MD     Universal Protocol:  Consent: Verbal consent obtained. Written consent obtained.      Procedure Details:  Procedure type: dilation of urethral stricture    Additional Procedure Details: Cystoscopy Procedure Note        Pre-operative Diagnosis: Interstitial cystitis    Post-operative Diagnosis: Same, urethral stenosis    Procedure: Flexible cystoscopy    Surgeon: Ronald Navas MD    Anesthesia: 1% Xylocaine per urethra    EBL: Minimal    Complications: none    Procedure Details   The risks, benefits, complications, treatment options, and expected outcomes were discussed with the  patient. The patient concurred with the proposed plan, giving informed consent.    Cystoscopy was performed today under local anesthesia, using sterile technique. The patient was placed in the supine position, prepped with Betadine, and draped in the usual sterile fashion. The flexible cystocope was used to inspect both the urethra and bladder    Findings:  Urethra: Stenotic-could not pass my scope.  Using Mulu sounds, I dilated from 16 Faroese to 24 Faroese.  Urethra was definitely snug.  I was unable to pass my scope.  The urethra was otherwise normal, no evidence of malignancy.    Bladder:  Smooth, not trabeculated and there were no stones tumors or other lesions.  The orifices were orthotopic and intact.  No Hunner's ulcers.           Specimens: None                 Complications:  None           Disposition: To home            Condition:  Stable              The following portions of the patient's history were reviewed and updated as appropriate: allergies, current medications, past family history, past medical history, past social history, past surgical history and problem list.  Past Medical History:   Diagnosis Date   • GERD (gastroesophageal reflux disease)    • Irritable bowel syndrome with constipation    • Sjogren's disease (HCC) 02/2022     Past Surgical History:   Procedure Laterality Date   • APPENDECTOMY  1997   • KNEE SURGERY Right 2000       Review of Systems   Review of Systems   Genitourinary:  Positive for frequency. Negative for dysuria, hematuria and pelvic pain.       Active Problem List     Patient Active Problem List   Diagnosis   • Abnormal MRI of head   • Neuropathy   • Hypersomnia   • Moderate persistent asthma without complication   • Allergic rhinitis   • Vitamin D deficiency   • Insomnia   • Sjogren's syndrome without extraglandular involvement ([facility])   • Environmental and seasonal allergies   • Cystitis, interstitial   • History of low potassium   • Joint stiffness   • Myalgia   •  "NCGS (non-celiac gluten sensitivity)   • Positive antinuclear antibody   • Gastroesophageal reflux disease   • Restless leg syndrome   • Sleep disturbance   • Sleep disorder, unspecified   • Chronically low serum potassium   • Radiculopathy, lumbar region       Objective   /80   Pulse 96   Temp (!) 96.7 °F (35.9 °C)   Ht 5' 3\" (1.6 m)   Wt 58.6 kg (129 lb 1.6 oz)   SpO2 96%   BMI 22.87 kg/m²     Physical Exam  Vitals reviewed.   Constitutional:       Appearance: Normal appearance. She is normal weight.      Comments: Pleasant, well groomed woman in no apparent distress.   HENT:      Head: Normocephalic and atraumatic.   Eyes:      Extraocular Movements: Extraocular movements intact.   Pulmonary:      Effort: Pulmonary effort is normal.   Abdominal:      Palpations: Abdomen is soft. There is mass.      Comments: Large suprapubic mass consistent with fibroid   Genitourinary:     General: Normal vulva.      Vagina: No vaginal discharge.      Comments: Some nevi with irregularity right inferior mons pubis near the vulva and some other small nevi.  The patient says these have been biopsied in the past and she sees dermatology once a year.  Introitus is normal without lesions, urethral snug as described.  No caruncle.  Musculoskeletal:         General: No swelling. Normal range of motion.      Cervical back: Normal range of motion.   Skin:     General: Skin is warm and dry.      Coloration: Skin is not jaundiced or pale.   Neurological:      General: No focal deficit present.      Mental Status: She is alert and oriented to person, place, and time.   Psychiatric:         Mood and Affect: Mood normal.         Behavior: Behavior normal.         Thought Content: Thought content normal.         Judgment: Judgment normal.             Current Medications     Current Outpatient Medications:   •  albuterol (PROVENTIL HFA,VENTOLIN HFA) 90 mcg/act inhaler, Inhale 2 puffs, Disp: , Rfl:   •  amLODIPine (NORVASC) 5 mg " tablet, Take 5 mg by mouth daily, Disp: , Rfl:   •  baclofen 10 mg tablet, 10 mg 2 (two) times a day Takes a night, Disp: , Rfl:   •  Calcium Carbonate 1500 (600 Ca) MG TABS, Take 1 tablet by mouth in the morning, Disp: , Rfl:   •  Cetirizine HCl (ZyrTEC Allergy) 10 MG CAPS, Take 1 tablet by mouth, Disp: , Rfl:   •  chloroquine (ARALEN) 250 MG tablet, Take 250 mg by mouth 3 (three) times a week, Disp: , Rfl:   •  ciprofloxacin (Cipro) 500 mg tablet, Take 1 tablet (500 mg total) by mouth once for 1 dose Take 1 dose after  procedure, Disp: 1 tablet, Rfl: 0  •  Cyanocobalamin (VITAMIN B12 PO), Take by mouth in the morning, Disp: , Rfl:   •  Diclofenac Sodium (VOLTAREN) 1 %, APPLY 2 GRAMS TO THE AFFECTED AREA(S) BY TOPICAL ROUTE 2-4 TIMES PER DAY, Disp: , Rfl:   •  ergocalciferol (VITAMIN D2) 50,000 units, , Disp: , Rfl:   •  Evening Primrose Oil 500 MG CAPS, Take by mouth, Disp: , Rfl:   •  famotidine (PEPCID) 40 MG tablet, TAKE 1 TABLET BY MOUTH TWICE A DAY, Disp: 180 tablet, Rfl: 1  •  Fluticasone Furoate-Vilanterol (Breo Ellipta) 100-25 mcg/actuation inhaler, Inhale 1 puff daily Rinse mouth after use., Disp: 60 blister, Rfl: 0  •  Klor-Con M10 10 MEQ tablet, Take 30 mEq by mouth daily, Disp: , Rfl:   •  magnesium oxide (MAG-OX) 400 mg tablet, , Disp: , Rfl:   •  meloxicam (MOBIC) 15 mg tablet, , Disp: , Rfl:   •  montelukast (SINGULAIR) 10 mg tablet, TAKE 1 TABLET BY MOUTH EVERY DAY, Disp: 90 tablet, Rfl: 1  •  omeprazole (PriLOSEC) 40 MG capsule, TAKE 1 CAPSULE (40 MG TOTAL) BY MOUTH DAILY., Disp: 90 capsule, Rfl: 1  •  oxybutynin (DITROPAN-XL) 5 mg 24 hr tablet, Take 1 tablet (5 mg total) by mouth daily, Disp: 30 tablet, Rfl: 11  •  pilocarpine (SALAGEN) 5 mg tablet, Take by mouth, Disp: , Rfl:   •  polyethylene glycol (GLYCOLAX) 17 GM/SCOOP powder, Take 17 g by mouth daily, Disp: , Rfl:   •  vitamin E 100 UNIT capsule, Take 100 Units by mouth, Disp: , Rfl:         Ronald Navas MD

## 2025-02-04 NOTE — PROGRESS NOTES
UROLOGY PROGRESS NOTE   [facility]  [address] [place] [address] [place]  [address]  [place] [address]  [phone]  Fax:[phone]  [email or web address]      NAME: Nel Guido  AGE: 59 y.o. SEX: female  : 1965   MRN: [id number]    DATE: 2025  TIME: 9:00 AM    Assessment and Plan:    History of interstitial cystitis-bladder is normal on cystoscopy, and I do not think this is currently an issue.  She is currently able to tolerate spicy or foods etc.  I think the issue with her pressure in her bladder with need for frequent voiding is primarily due to her fibroid uterus which is compressing her bladder.  I have reached out to gynecology to get her seen.  She also has a very stenotic urethra.  This was dilated and we will see how she does after the dilation.  It may improve things.  If it does she may need periodic dilations in the future.    Urinary frequency and nocturia-again I think this is mostly due to the uterus.  There is a possibility of a thickened bladder wall given her lower bladder capacity, but I think it would be reasonable to start her on Ditropan XL 5 mg daily.    I plan on seeing her 3 months with video visit or as needed.               Chief Complaint   No chief complaint on file.      History of Present Illness   59-year-old woman, established patient with new to me-previous seen by Jeannie Kerr PA-C 2024-history of interstitial cystitis managed by Dr. Yuliya Meléndez for many years with bladder installations last treatment May 2024.  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  He has not had a cystoscopy for 8-9 years.  Here for cystoscopy for further evaluation and consultation with me.  PVR was 124 cc last office visit.  Urinalysis last office visit showed large leukocyte esterase innumerable white blood cells 4-10 red blood cells per high-power field a lot of mucus threads and moderate epithelial cells and only occasional bacteria.  No urine culture was done with  that but urine culture August 12, 2024 was negative.  CT renal protocol September 27, 2024 showed bilateral nonobstructing kidney stones measuring up to 3 mm on the left and 2 mm on the right.  Otherwise unremarkable.  I personally reviewed her CT scan however and she has a very large uterus with fibroids and calcifications which take up quite a bit of space in her pelvis.  The bladder is also very thick-walled.  Current symptoms are feeling pressure in her bladder after she urinates and having to urinate in 15 minutes.  No pain.  Nocturia 4-5 times a night.  No gross hematuria.  She has not been on overactive bladder medications that she remembers.  She has 19 listed allergies.       Cystoscopy     Date/Time  2/4/2025 8:30 AM     Performed by  Ronald Navas MD   Authorized by  Ronald Navas MD     Universal Protocol:  Consent: Verbal consent obtained. Written consent obtained.      Procedure Details:  Procedure type: dilation of urethral stricture    Additional Procedure Details: Cystoscopy Procedure Note        Pre-operative Diagnosis: Interstitial cystitis    Post-operative Diagnosis: Same, urethral stenosis    Procedure: Flexible cystoscopy    Surgeon: Ronald Navas MD    Anesthesia: 1% Xylocaine per urethra    EBL: Minimal    Complications: none    Procedure Details   The risks, benefits, complications, treatment options, and expected outcomes were discussed with the patient. The patient concurred with the proposed plan, giving informed consent.    Cystoscopy was performed today under local anesthesia, using sterile technique. The patient was placed in the supine position, prepped with Betadine, and draped in the usual sterile fashion. The flexible cystocope was used to inspect both the urethra and bladder    Findings:  Urethra: Stenotic-could not pass my scope.  Using Mulu sounds, I dilated from 16 [place] African to 24 [place] African.  Urethra was definitely snug.  I was unable to pass my scope.  The urethra was otherwise  "normal, no evidence of malignancy.    Bladder:  Smooth, not trabeculated and there were no stones tumors or other lesions.  The orifices were orthotopic and intact.  No Hunner's ulcers.           Specimens: None                 Complications:  None           Disposition: To home            Condition:  Stable              The following portions of the patient's history were reviewed and updated as appropriate: allergies, current medications, past family history, past medical history, past social history, past surgical history and problem list.  Past Medical History:   Diagnosis Date    GERD (gastroesophageal reflux disease)     Irritable bowel syndrome with constipation     Sjogren's disease (HCC) 02/2022     Past Surgical History:   Procedure Laterality Date    APPENDECTOMY  1997    KNEE SURGERY Right 2000       Review of Systems   Review of Systems   Genitourinary:  Positive for frequency. Negative for dysuria, hematuria and pelvic pain.       Active Problem List     Patient Active Problem List   Diagnosis    Abnormal MRI of head    Neuropathy    Hypersomnia    Moderate persistent asthma without complication    Allergic rhinitis    Vitamin D deficiency    Insomnia    Sjogren's syndrome without extraglandular involvement ([facility])    Environmental and seasonal allergies    Cystitis, interstitial    History of low potassium    Joint stiffness    Myalgia    NCGS (non-celiac gluten sensitivity)    Positive antinuclear antibody    Gastroesophageal reflux disease    Restless leg syndrome    Sleep disturbance    Sleep disorder, unspecified    Chronically low serum potassium    Radiculopathy, lumbar region       Objective   /80   Pulse 96   Temp (!) 96.7 °F (35.9 °C)   Ht 5' 3\" (1.6 m)   Wt 58.6 kg (129 lb 1.6 oz)   SpO2 96%   BMI 22.87 kg/m²     Physical Exam  Vitals reviewed.   Constitutional:       Appearance: Normal appearance. She is normal weight.      Comments: Pleasant, well groomed woman in no apparent " distress.   HENT:      Head: Normocephalic and atraumatic.   Eyes:      Extraocular Movements: Extraocular movements intact.   Pulmonary:      Effort: Pulmonary effort is normal.   Abdominal:      Palpations: Abdomen is soft. There is mass.      Comments: Large suprapubic mass consistent with fibroid   Genitourinary:     General: Normal vulva.      Vagina: No vaginal discharge.      Comments: Some nevi with irregularity right inferior mons pubis near the vulva and some other small nevi.  The patient says these have been biopsied in the past and she sees dermatology once a year.  Introitus is normal without lesions, urethral snug as described.  No caruncle.  Musculoskeletal:         General: No swelling. Normal range of motion.      Cervical back: Normal range of motion.   Skin:     General: Skin is warm and dry.      Coloration: Skin is not jaundiced or pale.   Neurological:      General: No focal deficit present.      Mental Status: She is alert and oriented to person, place, and time.   Psychiatric:         Mood and Affect: Mood normal.         Behavior: Behavior normal.         Thought Content: Thought content normal.         Judgment: Judgment normal.             Current Medications     Current Outpatient Medications:     albuterol (PROVENTIL HFA,VENTOLIN HFA) 90 mcg/act inhaler, Inhale 2 puffs, Disp: , Rfl:     amLODIPine (NORVASC) 5 mg tablet, Take 5 mg by mouth daily, Disp: , Rfl:     baclofen 10 mg tablet, 10 mg 2 (two) times a day Takes a night, Disp: , Rfl:     Calcium Carbonate 1500 (600 Ca) MG TABS, Take 1 tablet by mouth in the morning, Disp: , Rfl:     Cetirizine HCl (ZyrTEC Allergy) 10 MG CAPS, Take 1 tablet by mouth, Disp: , Rfl:     chloroquine (ARALEN) 250 MG tablet, Take 250 mg by mouth 3 (three) times a week, Disp: , Rfl:     ciprofloxacin (Cipro) 500 mg tablet, Take 1 tablet (500 mg total) by mouth once for 1 dose Take 1 dose after  procedure, Disp: 1 tablet, Rfl: 0    Cyanocobalamin (VITAMIN  B12 PO), Take by mouth in the morning, Disp: , Rfl:     Diclofenac Sodium (VOLTAREN) 1 %, APPLY 2 GRAMS TO THE AFFECTED AREA(S) BY TOPICAL ROUTE 2-4 TIMES PER DAY, Disp: , Rfl:     ergocalciferol (VITAMIN D2) 50,000 units, , Disp: , Rfl:     Evening Primrose Oil 500 MG CAPS, Take by mouth, Disp: , Rfl:     famotidine (PEPCID) 40 MG tablet, TAKE 1 TABLET BY MOUTH TWICE A DAY, Disp: 180 tablet, Rfl: 1    Fluticasone Furoate-Vilanterol (Breo Ellipta) 100-25 mcg/actuation inhaler, Inhale 1 puff daily Rinse mouth after use., Disp: 60 blister, Rfl: 0    Klor-Con M10 10 MEQ tablet, Take 30 mEq by mouth daily, Disp: , Rfl:     magnesium oxide (MAG-OX) 400 mg tablet, , Disp: , Rfl:     meloxicam (MOBIC) 15 mg tablet, , Disp: , Rfl:     montelukast (SINGULAIR) 10 mg tablet, TAKE 1 TABLET BY MOUTH EVERY DAY, Disp: 90 tablet, Rfl: 1    omeprazole (PriLOSEC) 40 MG capsule, TAKE 1 CAPSULE (40 MG TOTAL) BY MOUTH DAILY., Disp: 90 capsule, Rfl: 1    oxybutynin (DITROPAN-XL) 5 mg 24 hr tablet, Take 1 tablet (5 mg total) by mouth daily, Disp: 30 tablet, Rfl: 11    pilocarpine (SALAGEN) 5 mg tablet, Take by mouth, Disp: , Rfl:     polyethylene glycol (GLYCOLAX) 17 GM/SCOOP powder, Take 17 g by mouth daily, Disp: , Rfl:     vitamin E 100 UNIT capsule, Take 100 Units by mouth, Disp: , Rfl:         Ronald Navas MD

## 2025-02-23 ENCOUNTER — PATIENT MESSAGE (OUTPATIENT)
Dept: UROLOGY | Facility: CLINIC | Age: 60
End: 2025-02-23

## 2025-02-24 DIAGNOSIS — R35.0 URINARY FREQUENCY: ICD-10-CM

## 2025-02-24 DIAGNOSIS — R35.1 NOCTURIA: ICD-10-CM

## 2025-02-24 NOTE — TELEPHONE ENCOUNTER
Nel Guido to  [facility] (supporting Ronald Navas MD)         2/23/25  3:12 PM  I’ve been taking this medication and it seems to be helping. Would you be able to send a refill for a 90 day supply?  Thank you,  Nel Guido

## 2025-02-25 RX ORDER — OXYBUTYNIN CHLORIDE 5 MG/1
5 TABLET, EXTENDED RELEASE ORAL DAILY
Qty: 90 TABLET | Refills: 1 | Status: SHIPPED | OUTPATIENT
Start: 2025-02-25

## 2025-03-18 ENCOUNTER — APPOINTMENT (OUTPATIENT)
Dept: LAB | Facility: HOSPITAL | Age: 60
End: 2025-03-18
Payer: COMMERCIAL

## 2025-03-18 DIAGNOSIS — M35.00 SICCA SYNDROME (HCC): ICD-10-CM

## 2025-03-18 DIAGNOSIS — E55.9 VITAMIN D DEFICIENCY, UNSPECIFIED: ICD-10-CM

## 2025-03-18 LAB
25(OH)D3 SERPL-MCNC: 51.4 NG/ML (ref 30–100)
ALBUMIN SERPL BCG-MCNC: 4.6 G/DL (ref 3.5–5)
ALP SERPL-CCNC: 80 U/L (ref 34–104)
ALT SERPL W P-5'-P-CCNC: 14 U/L (ref 7–52)
ANION GAP SERPL CALCULATED.3IONS-SCNC: 7 MMOL/L (ref 4–13)
AST SERPL W P-5'-P-CCNC: 16 U/L (ref 13–39)
BACTERIA UR QL AUTO: ABNORMAL /HPF
BILIRUB SERPL-MCNC: 0.75 MG/DL (ref 0.2–1)
BILIRUB UR QL STRIP: NEGATIVE
BUN SERPL-MCNC: 14 MG/DL (ref 5–25)
C3 SERPL-MCNC: 134 MG/DL (ref 87–200)
C4 SERPL-MCNC: 35 MG/DL (ref 19–52)
CALCIUM SERPL-MCNC: 9.2 MG/DL (ref 8.4–10.2)
CHLORIDE SERPL-SCNC: 105 MMOL/L (ref 96–108)
CLARITY UR: ABNORMAL
CO2 SERPL-SCNC: 30 MMOL/L (ref 21–32)
COLOR UR: YELLOW
CREAT SERPL-MCNC: 0.67 MG/DL (ref 0.6–1.3)
ERYTHROCYTE [DISTWIDTH] IN BLOOD BY AUTOMATED COUNT: 12.3 % (ref 11.6–15.1)
GFR SERPL CREATININE-BSD FRML MDRD: 97 ML/MIN/1.73SQ M
GLUCOSE P FAST SERPL-MCNC: 99 MG/DL (ref 65–99)
GLUCOSE UR STRIP-MCNC: NEGATIVE MG/DL
HCT VFR BLD AUTO: 46.8 % (ref 34.8–46.1)
HGB BLD-MCNC: 15.8 G/DL (ref 11.5–15.4)
HGB UR QL STRIP.AUTO: NEGATIVE
KETONES UR STRIP-MCNC: NEGATIVE MG/DL
LEUKOCYTE ESTERASE UR QL STRIP: ABNORMAL
MCH RBC QN AUTO: 29.5 PG (ref 26.8–34.3)
MCHC RBC AUTO-ENTMCNC: 33.8 G/DL (ref 31.4–37.4)
MCV RBC AUTO: 87 FL (ref 82–98)
MUCOUS THREADS UR QL AUTO: ABNORMAL
NITRITE UR QL STRIP: NEGATIVE
NON-SQ EPI CELLS URNS QL MICRO: ABNORMAL /HPF
PH UR STRIP.AUTO: 8 [PH]
PLATELET # BLD AUTO: 269 THOUSANDS/UL (ref 149–390)
PMV BLD AUTO: 9.8 FL (ref 8.9–12.7)
POTASSIUM SERPL-SCNC: 3.2 MMOL/L (ref 3.5–5.3)
PROT SERPL-MCNC: 6.9 G/DL (ref 6.4–8.4)
PROT UR STRIP-MCNC: NEGATIVE MG/DL
RBC # BLD AUTO: 5.36 MILLION/UL (ref 3.81–5.12)
RBC #/AREA URNS AUTO: ABNORMAL /HPF
SODIUM SERPL-SCNC: 142 MMOL/L (ref 135–147)
SP GR UR STRIP.AUTO: 1.01 (ref 1–1.03)
UROBILINOGEN UR STRIP-ACNC: <2 MG/DL
WBC # BLD AUTO: 8.96 THOUSAND/UL (ref 4.31–10.16)
WBC #/AREA URNS AUTO: ABNORMAL /HPF

## 2025-03-18 PROCEDURE — 86160 COMPLEMENT ANTIGEN: CPT

## 2025-03-18 PROCEDURE — 81001 URINALYSIS AUTO W/SCOPE: CPT

## 2025-03-18 PROCEDURE — 85027 COMPLETE CBC AUTOMATED: CPT

## 2025-03-18 PROCEDURE — 80053 COMPREHEN METABOLIC PANEL: CPT

## 2025-03-18 PROCEDURE — 36415 COLL VENOUS BLD VENIPUNCTURE: CPT

## 2025-03-18 PROCEDURE — 82306 VITAMIN D 25 HYDROXY: CPT

## 2025-05-26 DIAGNOSIS — K21.9 GASTROESOPHAGEAL REFLUX DISEASE WITHOUT ESOPHAGITIS: ICD-10-CM

## 2025-05-27 RX ORDER — FAMOTIDINE 40 MG/1
40 TABLET, FILM COATED ORAL 2 TIMES DAILY
Qty: 180 TABLET | Refills: 1 | Status: SHIPPED | OUTPATIENT
Start: 2025-05-27

## 2025-05-27 RX ORDER — OMEPRAZOLE 40 MG/1
40 CAPSULE, DELAYED RELEASE ORAL DAILY
Qty: 90 CAPSULE | Refills: 1 | Status: SHIPPED | OUTPATIENT
Start: 2025-05-27

## 2025-07-10 ENCOUNTER — APPOINTMENT (EMERGENCY)
Dept: RADIOLOGY | Facility: HOSPITAL | Age: 60
End: 2025-07-10
Payer: COMMERCIAL

## 2025-07-10 ENCOUNTER — HOSPITAL ENCOUNTER (EMERGENCY)
Facility: HOSPITAL | Age: 60
Discharge: HOME | End: 2025-07-10
Payer: COMMERCIAL

## 2025-07-10 VITALS
HEIGHT: 63 IN | OXYGEN SATURATION: 98 % | TEMPERATURE: 98.5 F | SYSTOLIC BLOOD PRESSURE: 161 MMHG | RESPIRATION RATE: 18 BRPM | HEART RATE: 80 BPM | BODY MASS INDEX: 22.73 KG/M2 | WEIGHT: 128.31 LBS | DIASTOLIC BLOOD PRESSURE: 105 MMHG

## 2025-07-10 DIAGNOSIS — S52.501A NONDISPLACED FRACTURE OF DISTAL END OF RIGHT RADIUS: ICD-10-CM
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
## 2025-07-10 PROCEDURE — 99284 EMERGENCY DEPT VISIT MOD MDM: CPT

## 2025-07-10 PROCEDURE — 73610 X-RAY EXAM OF ANKLE: CPT

## 2025-07-10 PROCEDURE — 73110 X-RAY EXAM OF WRIST: CPT

## 2025-07-10 PROCEDURE — 73564 X-RAY EXAM KNEE 4 OR MORE: CPT

## 2025-07-10 RX ORDER — ACETAMINOPHEN 325 MG/1
975 TABLET ORAL ONCE
Status: COMPLETED | OUTPATIENT
Start: 2025-07-10 | End: 2025-07-10

## 2025-07-10 RX ADMIN — ACETAMINOPHEN 975 MG: 325 TABLET ORAL at 18:27

## 2025-07-10 NOTE — DISCHARGE INSTRUCTIONS
Follow up with orthopedics as discussed.     Keep splint on until seen by ortho.    Return to the ED with any new/concerning issues.     Tylenol,Meloxicam for pain control.

## 2025-07-11 ENCOUNTER — TELEPHONE (OUTPATIENT)
Age: 60
End: 2025-07-11
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
## 2025-07-17 ENCOUNTER — OFFICE VISIT (OUTPATIENT)
Dept: OBGYN CLINIC | Facility: CLINIC | Age: 60
End: 2025-07-17
Payer: COMMERCIAL

## 2025-07-17 VITALS — HEIGHT: 63 IN | BODY MASS INDEX: 22.73 KG/M2

## 2025-07-17 DIAGNOSIS — S60.211A CONTUSION OF RIGHT WRIST, INITIAL ENCOUNTER: ICD-10-CM

## 2025-07-17 DIAGNOSIS — S52.501A NONDISPLACED FRACTURE OF DISTAL END OF RIGHT RADIUS: ICD-10-CM

## 2025-07-17 DIAGNOSIS — M18.11 ARTHRITIS OF CARPOMETACARPAL (CMC) JOINT OF RIGHT THUMB: Primary | ICD-10-CM

## 2025-07-17 PROCEDURE — 99203 OFFICE O/P NEW LOW 30 MIN: CPT | Performed by: ORTHOPAEDIC SURGERY
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
## 2025-07-19 PROBLEM — S52.501A NONDISPLACED FRACTURE OF DISTAL END OF RIGHT RADIUS: Status: ACTIVE | Noted: 2025-07-19

## 2025-07-19 PROBLEM — S60.211A CONTUSION OF RIGHT WRIST: Status: ACTIVE | Noted: 2025-07-19

## 2025-07-20 NOTE — ASSESSMENT & PLAN NOTE
Xrays reviewed in the office today  Begin outpatient occupational therapy  MRI ordered to evaluate for fracture or nerve impingement at wrist  OTC analgesics as needed for symptom management  Follow up after MRI        Orders:    MRI wrist right wo contrast; Future    Ambulatory Referral to PT/OT Hand Therapy; Future

## 2025-08-09 ENCOUNTER — HOSPITAL ENCOUNTER (OUTPATIENT)
Dept: MRI IMAGING | Facility: HOSPITAL | Age: 60
Discharge: HOME | End: 2025-08-09
Attending: ORTHOPAEDIC SURGERY
Payer: COMMERCIAL